# Patient Record
Sex: FEMALE | Race: WHITE | NOT HISPANIC OR LATINO | Employment: UNEMPLOYED | URBAN - METROPOLITAN AREA
[De-identification: names, ages, dates, MRNs, and addresses within clinical notes are randomized per-mention and may not be internally consistent; named-entity substitution may affect disease eponyms.]

---

## 2017-03-07 ENCOUNTER — ALLSCRIPTS OFFICE VISIT (OUTPATIENT)
Dept: OTHER | Facility: OTHER | Age: 47
End: 2017-03-07

## 2017-05-15 ENCOUNTER — ALLSCRIPTS OFFICE VISIT (OUTPATIENT)
Dept: OTHER | Facility: OTHER | Age: 47
End: 2017-05-15

## 2017-06-01 ENCOUNTER — GENERIC CONVERSION - ENCOUNTER (OUTPATIENT)
Dept: OTHER | Facility: OTHER | Age: 47
End: 2017-06-01

## 2017-10-20 ENCOUNTER — ALLSCRIPTS OFFICE VISIT (OUTPATIENT)
Dept: OTHER | Facility: OTHER | Age: 47
End: 2017-10-20

## 2017-10-21 NOTE — PROGRESS NOTES
Assessment  1  Cellulitis (682 9) (L03 90)    Plan  Cellulitis    · Cephalexin 500 MG Oral Capsule; TAKE 1 CAPSULE EVERY 12 HOURS DAILY   · Follow Up if Not Better Evaluation and Treatment  Follow-up  Status: Complete  Done:  [id number] 01:59PM    Chief Complaint  PT C/O BEE STING ON LT ANKLE X 2 WEEKS  PT STATES IT HAS BECOME REAL ITCHY AND SWELLS AT NIGHT  History of Present Illness  HPI: Patient is here status post bite left lateral ankle roughly 1/2 weeks ago  Patient with itching over the past 3 days  No drainage or redness noted  No fever noted  No pain noted  Patient using Neosporin  Review of Systems    Constitutional: No fever, no chills, feels well, no tiredness, no recent weight gain or loss  ENT: no ear ache, no loss of hearing, no nosebleeds or nasal discharge, no sore throat or hoarseness  Cardiovascular: no complaints of slow or fast heart rate, no chest pain, no palpitations, no leg claudication or lower extremity edema  Respiratory: no complaints of shortness of breath, no wheezing, no dyspnea on exertion, no orthopnea or PND  Breasts: no complaints of breast pain, breast lump or nipple discharge  Gastrointestinal: no complaints of abdominal pain, no constipation, no nausea or diarrhea, no vomiting, no bloody stools  Genitourinary: no complaints of dysuria, no incontinence, no pelvic pain, no dysmenorrhea, no vaginal discharge or abnormal vaginal bleeding  Musculoskeletal: no complaints of arthralgia, no myalgia, no joint swelling or stiffness, no limb pain or swelling  Integumentary: as noted in HPI  Neurological: no complaints of headache, no confusion, no numbness or tingling, no dizziness or fainting  Active Problems  1  Acute sinusitis (461 9) (J01 90)   2  Bilateral impacted cerumen (380 4) (H61 23)   3  Menstrual migraine without status migrainosus, not intractable (346 40) (G40 619)   4  Need for immunization against malaria (V05 8) (Z23)   5   Onychomycosis (110 1) (B35 1)   6  Other screening mammogram (V76 12) (Z12 31)   7  Tinea cruris (110 3) (B35 6)   8  Travel advice encounter (V65 49) (Z71 89)   9  Vertigo (780 4) (R42)   10  Viral Labyrinthitis (386 35)    Past Medical History  Active Problems And Past Medical History Reviewed: The active problems and past medical history were reviewed and updated today  Social History   · Denied: History of Alcohol Use (History)   · Never A Smoker    Surgical History  1  History of  Section   2  History of Complete Colonoscopy    Current Meds    The medication list was reviewed and updated today  Allergies  1  Amoxicillin CAPS   2  Penicillins   3  Sulfa Drugs    Vitals   Recorded: [id number] 01:47PM   Temperature 28 4 F   Systolic 592   Diastolic 60   Height 5 ft 4 in   Weight 136 lb    BMI Calculated 23 34   BSA Calculated 1 66     Physical Exam    Constitutional   General appearance: No acute distress, well appearing and well nourished  Skin   Skin and subcutaneous tissue: Abnormal  -- Mild redness around the bite left ankle          Signatures   Electronically signed by : Zohra Aguirre DO; Oct 20 2017  2:00PM EST                       (Author)

## 2018-01-13 VITALS
SYSTOLIC BLOOD PRESSURE: 118 MMHG | HEIGHT: 64 IN | WEIGHT: 133 LBS | DIASTOLIC BLOOD PRESSURE: 64 MMHG | BODY MASS INDEX: 22.71 KG/M2 | TEMPERATURE: 97.7 F

## 2018-01-14 VITALS
SYSTOLIC BLOOD PRESSURE: 124 MMHG | TEMPERATURE: 97 F | BODY MASS INDEX: 22.2 KG/M2 | WEIGHT: 130 LBS | DIASTOLIC BLOOD PRESSURE: 70 MMHG | HEIGHT: 64 IN

## 2018-01-14 VITALS
DIASTOLIC BLOOD PRESSURE: 60 MMHG | BODY MASS INDEX: 23.22 KG/M2 | TEMPERATURE: 98.1 F | WEIGHT: 136 LBS | HEIGHT: 64 IN | SYSTOLIC BLOOD PRESSURE: 100 MMHG

## 2018-06-22 ENCOUNTER — OFFICE VISIT (OUTPATIENT)
Dept: FAMILY MEDICINE CLINIC | Facility: CLINIC | Age: 48
End: 2018-06-22
Payer: COMMERCIAL

## 2018-06-22 VITALS
SYSTOLIC BLOOD PRESSURE: 112 MMHG | HEIGHT: 64 IN | WEIGHT: 131 LBS | TEMPERATURE: 97.7 F | DIASTOLIC BLOOD PRESSURE: 68 MMHG | BODY MASS INDEX: 22.36 KG/M2

## 2018-06-22 DIAGNOSIS — S39.012A STRAIN OF LUMBAR REGION, INITIAL ENCOUNTER: ICD-10-CM
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
## 2018-06-22 DIAGNOSIS — S16.1XXA STRAIN OF NECK MUSCLE, INITIAL ENCOUNTER: Primary | ICD-10-CM

## 2018-06-22 DIAGNOSIS — S29.019A THORACIC MYOFASCIAL STRAIN, INITIAL ENCOUNTER: ICD-10-CM

## 2018-06-22 PROCEDURE — 99214 OFFICE O/P EST MOD 30 MIN: CPT | Performed by: FAMILY MEDICINE
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
## 2018-07-05 ENCOUNTER — OFFICE VISIT (OUTPATIENT)
Dept: FAMILY MEDICINE CLINIC | Facility: CLINIC | Age: 48
End: 2018-07-05
Payer: COMMERCIAL

## 2018-07-05 VITALS
DIASTOLIC BLOOD PRESSURE: 74 MMHG | SYSTOLIC BLOOD PRESSURE: 112 MMHG | WEIGHT: 135.6 LBS | BODY MASS INDEX: 23.15 KG/M2 | HEIGHT: 64 IN

## 2018-07-05 DIAGNOSIS — Z12.4 SCREENING FOR CERVICAL CANCER: Primary | ICD-10-CM

## 2018-07-05 DIAGNOSIS — M54.12 CERVICAL RADICULOPATHY: ICD-10-CM

## 2018-07-05 DIAGNOSIS — S29.019A THORACIC MYOFASCIAL STRAIN, INITIAL ENCOUNTER: ICD-10-CM

## 2018-07-05 DIAGNOSIS — S39.012A STRAIN OF LUMBAR REGION, INITIAL ENCOUNTER: ICD-10-CM

## 2018-07-05 PROCEDURE — 99214 OFFICE O/P EST MOD 30 MIN: CPT | Performed by: FAMILY MEDICINE
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
## 2018-07-31 ENCOUNTER — OFFICE VISIT (OUTPATIENT)
Dept: FAMILY MEDICINE CLINIC | Facility: CLINIC | Age: 48
End: 2018-07-31
Payer: COMMERCIAL

## 2018-07-31 VITALS
DIASTOLIC BLOOD PRESSURE: 62 MMHG | SYSTOLIC BLOOD PRESSURE: 100 MMHG | BODY MASS INDEX: 23.35 KG/M2 | WEIGHT: 136.8 LBS | HEIGHT: 64 IN

## 2018-07-31 DIAGNOSIS — M25.551 RIGHT HIP PAIN: Primary | ICD-10-CM

## 2018-07-31 PROCEDURE — 99213 OFFICE O/P EST LOW 20 MIN: CPT | Performed by: FAMILY MEDICINE
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
## 2018-08-01 ENCOUNTER — TELEPHONE (OUTPATIENT)
Dept: FAMILY MEDICINE CLINIC | Facility: CLINIC | Age: 48
End: 2018-08-01

## 2018-08-02 NOTE — TELEPHONE ENCOUNTER
----- Message from Erma Malagon DO sent at 8/1/2018  5:04 PM EDT -----  Call patient    Mammogram normal   Recheck 1 year

## 2018-08-03 ENCOUNTER — HOSPITAL ENCOUNTER (OUTPATIENT)
Dept: RADIOLOGY | Facility: HOSPITAL | Age: 48
Discharge: HOME | End: 2018-08-03
Payer: COMMERCIAL

## 2018-08-03 DIAGNOSIS — M25.551 RIGHT HIP PAIN: ICD-10-CM

## 2018-08-03 PROCEDURE — 73502 X-RAY EXAM HIP UNI 2-3 VIEWS: CPT

## 2018-08-08 ENCOUNTER — EVALUATION (OUTPATIENT)
Dept: PHYSICAL THERAPY | Facility: CLINIC | Age: 48
End: 2018-08-08
Payer: COMMERCIAL

## 2018-08-08 DIAGNOSIS — M25.551 RIGHT HIP PAIN: Primary | ICD-10-CM

## 2018-08-08 PROCEDURE — G8979 MOBILITY GOAL STATUS: HCPCS

## 2018-08-08 PROCEDURE — 97161 PT EVAL LOW COMPLEX 20 MIN: CPT

## 2018-08-08 PROCEDURE — G8978 MOBILITY CURRENT STATUS: HCPCS
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
## 2018-08-16 ENCOUNTER — OFFICE VISIT (OUTPATIENT)
Dept: PHYSICAL THERAPY | Facility: CLINIC | Age: 48
End: 2018-08-16
Payer: COMMERCIAL

## 2018-08-16 DIAGNOSIS — M25.551 RIGHT HIP PAIN: Primary | ICD-10-CM

## 2018-08-16 PROCEDURE — 97110 THERAPEUTIC EXERCISES: CPT

## 2018-08-16 NOTE — PROGRESS NOTES
Daily Note     Today's date: 2018  Patient name: Cornell Mckinney  : 1970  MRN: [id number]  Referring provider: Lisa Mckenna DO  Dx:   Encounter Diagnosis     ICD-10-CM    1  Right hip pain M25 551                   Subjective: Patient presents for first treatment since IE one week ago  She states since that time, she has been consistent with HEP and has no issues or symptoms when completing, however she has not noticed any improvement in R hip discomfort  Objective: See treatment diary below  Progressed strengthening program this visit  Assessment: Tolerated treatment well  Good performance of all activities today with no increase in discomfort noted  Patient did report muscle "burning" during hip program, likely due to decreased strength/endurance of muscles  Patient exhibited good technique with therapeutic exercises and would benefit from continued PT      Plan: Continue per plan of care  Progress treatment as tolerated        Precautions: none    Daily Treatment Diary         Manuals                       HS, piriformis stretch AN                     Manual Geoff stretch AN                                                                     Exercise Diary                        Bike 10'                                            HS stretch 30"x3                     Glute bridge 3x10                     Clam shells 3" 2x15                     SLR flexion 1# 2x10                     SLR abduction 1# 2x10                                                                     Iso hip hike NV                     Mini squats 3x10                     Black band stepping S/S 5 ea                     Step ups Lateral 6"x10                     Rocker board balance AP/ML 2' ea                     HR/TR 30 ea                                                                                                                                             Modalities

## 2018-08-22 ENCOUNTER — OFFICE VISIT (OUTPATIENT)
Dept: PHYSICAL THERAPY | Facility: CLINIC | Age: 48
End: 2018-08-22
Payer: COMMERCIAL

## 2018-08-22 DIAGNOSIS — M25.551 RIGHT HIP PAIN: Primary | ICD-10-CM

## 2018-08-22 PROCEDURE — 97140 MANUAL THERAPY 1/> REGIONS: CPT

## 2018-08-22 PROCEDURE — 97110 THERAPEUTIC EXERCISES: CPT

## 2018-08-22 NOTE — PROGRESS NOTES
Daily Note     Today's date: 2018  Patient name: Iva Escalante  : 1970  MRN: [id number]  Referring provider: Taylor Singh DO  Dx:   Encounter Diagnosis     ICD-10-CM    1  Right hip pain M25 551                   Subjective: Patient reported not noticing symptoms in R hip as much compared to previously  Objective: See treatment diary below  Updated home program       Assessment: No difficulty completing program other than expected fatigue with strengthening due to weakness  Manual addressed presence of LE flexibility deficits  Plan: Continue per plan of care  Progress treatment as tolerated           Precautions: none    Daily Treatment Diary  Manuals                     HS, piriformis stretch AN  EV                   Manual Geoff stretch AN  EV                                                                   Exercise Diary                        Bike 10'  10 min                                          HS stretch 30"x3 30"x3                   Glute bridge 3x10 3x10                   Clam shells 3" 2x15  3" hold 2x15                   SLR flexion 1# 2x10  1# 2x15                   SLR abduction 1# 2x10  1# 2x15                                                                   Iso hip hike NV  NV                   Mini squats 3x10 3x10                   Black band stepping S/S 5 ea x5 ea                   Step ups - lateral Lateral 6"x10 6"  x15                   Rocker board balance AP/ML 2' ea AP/ML 2 min ea                   HR/TR 30 ea x30 ea                                                                                                                                           Modalities                                                                        HEP: bridges, SLR flex, SLR abd, clamshells

## 2018-08-29 ENCOUNTER — OFFICE VISIT (OUTPATIENT)
Dept: PHYSICAL THERAPY | Facility: CLINIC | Age: 48
End: 2018-08-29
Payer: COMMERCIAL

## 2018-08-29 DIAGNOSIS — M25.551 RIGHT HIP PAIN: Primary | ICD-10-CM

## 2018-08-29 PROCEDURE — 97110 THERAPEUTIC EXERCISES: CPT

## 2018-08-29 PROCEDURE — 97140 MANUAL THERAPY 1/> REGIONS: CPT

## 2018-08-29 NOTE — PROGRESS NOTES
Daily Note     Today's date: 2018  Patient name: Tae Odell  : 1970  MRN: [id number]  Referring provider: Isabella Lundy DO  Dx:   Encounter Diagnosis     ICD-10-CM    1  Right hip pain M25 551                   Subjective: Patient has had no increase in R hip symptoms since previous treatment, having no difficulty tolerating car ride to [place] over the the weekend  Objective: See treatment diary below      Assessment: Progressed hip strengthening with minimal to no difficulty, rather some fatigue due to LE weakness  Manual remains appropriate to address LE flexibility deficits  Plan: Continue per plan of care  Progress treatment as tolerated           Precautions: none    Daily Treatment Diary  Manuals                   HS, piriformis stretch AN  EV  EV                 Manual Geoff stretch AN  EV  EV                                                                 Exercise Diary                        Bike 10'  10 min  10 min                                        HS stretch 30"x3 30"x3  30"x3                 Glute bridge 3x10 3x10  3x10                 Clam shells 3" 2x15  3" hold 2x15  3" hold, 2x15                 SLR flexion 1# 2x10  1# 2x15  2# 2x10                 SLR abduction 1# 2x10  1# 2x15  2# 2x10                                                                 Iso hip hike NV  NV  3"x  20                 Mini squats 3x10 3x10  3x10                 Black band stepping S/S 5 ea x5 ea  x5 ea                 Step ups - lateral Lateral 6"x10 6"  x15  6"   x20                 Rocker board balance AP/ML 2' ea AP/ML 2 min ea  AP/ML 2 min ea                 HR/TR 30 ea x30 ea  x30 ea                                                                                                                                         Modalities                                                                        HEP: bridges, SLR flex, SLR abd, clamshells, hip hike

## 2018-08-31 ENCOUNTER — OFFICE VISIT (OUTPATIENT)
Dept: FAMILY MEDICINE CLINIC | Facility: CLINIC | Age: 48
End: 2018-08-31
Payer: COMMERCIAL

## 2018-08-31 VITALS
HEIGHT: 64 IN | SYSTOLIC BLOOD PRESSURE: 114 MMHG | BODY MASS INDEX: 23.52 KG/M2 | OXYGEN SATURATION: 99 % | RESPIRATION RATE: 12 BRPM | HEART RATE: 77 BPM | DIASTOLIC BLOOD PRESSURE: 68 MMHG | WEIGHT: 137.8 LBS

## 2018-08-31 DIAGNOSIS — M25.551 RIGHT HIP PAIN: Primary | ICD-10-CM
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
## 2018-08-31 PROCEDURE — 99213 OFFICE O/P EST LOW 20 MIN: CPT | Performed by: FAMILY MEDICINE
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
## 2018-09-05 ENCOUNTER — OFFICE VISIT (OUTPATIENT)
Dept: PHYSICAL THERAPY | Facility: CLINIC | Age: 48
End: 2018-09-05
Payer: COMMERCIAL

## 2018-09-05 DIAGNOSIS — M25.551 RIGHT HIP PAIN: Primary | ICD-10-CM

## 2018-09-05 PROCEDURE — G8979 MOBILITY GOAL STATUS: HCPCS

## 2018-09-05 PROCEDURE — 97140 MANUAL THERAPY 1/> REGIONS: CPT

## 2018-09-05 PROCEDURE — G8980 MOBILITY D/C STATUS: HCPCS

## 2018-09-05 PROCEDURE — 97110 THERAPEUTIC EXERCISES: CPT

## 2018-09-05 NOTE — PROGRESS NOTES
PT Discharge    Today's date: 2018  Patient name: Neena Griffith  : 1970  MRN: [id number]  Referring provider: Michael   Dx:   Encounter Diagnosis     ICD-10-CM    1  Right hip pain M25 [id number]                   Assessment    Assessment details: Neena Griffith has been compliant with attending PT and HEP since initial eval  Rudy Vazquez has made improvements in objective data since IE and has maximized function  Patient is agreeable to d/c to HEP at this time, and will contact clinic with any exercise related questions or concerns as needed  Understanding of Dx/Px/POC: good   Prognosis: good    Goals  STG  1) R hip strength will improve 1/2 grade in 3 weeks  -Met  2) Patient will tolerate 3 mile walk through neighborhood with less than 3/10 pain in 3 weeks  -Met  LTG  1) Patient is independent in HEP  -Met  2) R hip strength will be at least 4+/5 in 6 weeks  -Met  3) Patient will ascend/descend one flight of stairs independently with no increased pain in 6 weeks  -Met  4) Ambulation will be improved to PLOF in 6 weeks  -Met  5) Recreational performance will be improved to PLOF in 6 weeks  -Met    Plan  Planned therapy interventions: home exercise program  Treatment plan discussed with: patient        Subjective Evaluation    History of Present Illness  Date of onset: 2018  Mechanism of injury: Patient states her hip is feeling much better than when she began therapy  She reports she has been compliant with completing HEP and feels comfortable continuing on her own  Quality of life: good    Pain  Current pain ratin  At worst pain rating: 3  Quality: dull ache  Aggravating factors: running  Progression: improved      Diagnostic Tests  X-ray: normal  Patient Goals  Patient goals for therapy: decreased pain and return to sport/leisure activities  Patient goal: get back to yoga        Objective     Palpation     Right   No palpable tenderness to the gluteus medius, piriformis and TFL  Tenderness     Right Hip   No tenderness in the greater trochanter  Lumbar Screen  Lumbar range of motion within normal limits  Neurological Testing     Additional Neurological Details  Normal LE neuro screen    Active Range of Motion     Right Hip   Normal active range of motion    Strength/Myotome Testing     Right Hip   Planes of Motion   Flexion: 5  Extension: 5  Abduction: 5  Adduction: 5    Isolated Muscles   Gluteus maximums: 5  Gluteus medius: 5    Tests     Right Hip   Positive modified Ammy  Negative Ammy       Precautions: none    Daily Treatment Diary  Manuals  8/16 8/22 8/29  9/5               HS, piriformis stretch AN  EV  EV  AN               Manual Geoff stretch AN  EV  EV  AN                                                               Exercise Diary                        Bike 10'  10 min 10 min 10'                                      HS stretch 30"x3 30"x3 30"x3 30"x3               Glute bridge 3x10 3x10 3x10 3x10               Clam shells 3" 2x15  3" hold 2x15 3" hold, 2x15 PTB 3" hold 2x10               SLR flexion 1# 2x10  1# 2x15 2# 2x10 2# 2x10               SLR abduction 1# 2x10  1# 2x15 2# 2x10 2# 2x10                                                               Iso hip hike NV  NV 3"x  20 5"x20               Mini squats 3x10 3x10 3x10 5# 3x10               Black band stepping S/S 5 ea x5 ea x5 ea S/S 10 ea               Step ups - lateral Lateral 6"x10 6"  x15 6"   x20 6"  x30               Rocker board balance AP/ML 2' ea AP/ML 2 min ea AP/ML 2 min ea AP/ML 2 min ea               HR/TR 30 ea x30 ea x30 ea 30 ea                                                                                                                                       Modalities

## 2018-09-21 ENCOUNTER — OFFICE VISIT (OUTPATIENT)
Dept: FAMILY MEDICINE CLINIC | Facility: CLINIC | Age: 48
End: 2018-09-21
Payer: COMMERCIAL

## 2018-09-21 VITALS
HEIGHT: 64 IN | DIASTOLIC BLOOD PRESSURE: 68 MMHG | BODY MASS INDEX: 22.94 KG/M2 | SYSTOLIC BLOOD PRESSURE: 118 MMHG | WEIGHT: 134.4 LBS

## 2018-09-21 DIAGNOSIS — T63.444A BEE STING, UNDETERMINED INTENT, INITIAL ENCOUNTER: Primary | ICD-10-CM

## 2018-09-21 PROBLEM — T63.441A BEE STING: Status: ACTIVE | Noted: 2018-09-21

## 2018-09-21 PROCEDURE — 99213 OFFICE O/P EST LOW 20 MIN: CPT | Performed by: FAMILY MEDICINE

## 2018-09-21 PROCEDURE — 3008F BODY MASS INDEX DOCD: CPT | Performed by: FAMILY MEDICINE

## 2018-09-21 RX ORDER — PREDNISONE 20 MG/1
40 TABLET ORAL DAILY
Qty: 10 TABLET | Refills: 0 | Status: SHIPPED | OUTPATIENT
Start: 2018-09-21 | End: 2020-03-10

## 2018-09-21 RX ORDER — AZITHROMYCIN 250 MG/1
TABLET, FILM COATED ORAL
Qty: 6 TABLET | Refills: 0 | Status: SHIPPED | OUTPATIENT
Start: 2018-09-21 | End: 2018-09-26

## 2018-09-21 RX ORDER — LORATADINE 10 MG/1
10 TABLET ORAL DAILY
Qty: 30 TABLET | Refills: 0 | Status: SHIPPED | OUTPATIENT
Start: 2018-09-21 | End: 2020-03-10

## 2018-09-21 NOTE — PROGRESS NOTES
Assessment/Plan:    53-year-old woman with:  Bee stings  Discussed treatment options with risks and benefits  Encouraged to daily Claritin with Sarna lotion as needed topically and will give steroid burst and advised that if symptoms are not resolving she can begin a Z-Uriel  No problem-specific Assessment & Plan notes found for this encounter  Diagnoses and all orders for this visit:    Bee sting, undetermined intent, initial encounter  -     predniSONE 20 mg tablet; Take 2 tablets (40 mg total) by mouth daily  -     azithromycin (ZITHROMAX) 250 mg tablet; Take 2 tabs by mouth on day 1 then 1 tab daily for 4 more days  -     camphor-menthol (SARNA) lotion; Apply topically as needed for itching  -     loratadine (CLARITIN) 10 mg tablet; Take 1 tablet (10 mg total) by mouth daily          Subjective:   Chief Complaint   Patient presents with    Follow-up     Patient reports she was stung by at least 3 Bee's 2 days ago and slight swelling at sting sites and left arm is sore   Medication Refill     [facility] in [facility] for any med today  Patient ID: Eva Dupont is a 57 y o  female  Patient is a 53-year-old woman who presents complaining of pain redness and swelling with irritation of bilateral arms at the site of multiple bee stings that she got 2 days ago  No fevers chills nausea vomiting  Tolerating p o  intake  Medication Refill         The following portions of the patient's history were reviewed and updated as appropriate: allergies, current medications, past family history, past medical history, past social history, past surgical history and problem list     Review of Systems   Constitutional: Negative  HENT: Negative  Eyes: Negative  Respiratory: Negative  Cardiovascular: Negative  Gastrointestinal: Negative  Endocrine: Negative  Genitourinary: Negative  Musculoskeletal: Negative  Skin: Positive for wound  Allergic/Immunologic: Negative  Neurological: Negative  Hematological: Negative  Psychiatric/Behavioral: Negative  All other systems reviewed and are negative  Objective:      /68 (BP Location: Right arm, Patient Position: Sitting, Cuff Size: Standard)   Ht 5' 3 5" (1 613 m)   Wt 61 kg (134 lb 6 4 oz)   BMI 23 43 kg/m²          Physical Exam   Constitutional: She is oriented to person, place, and time  She appears well-developed and well-nourished  HENT:   Head: Atraumatic  Right Ear: External ear normal    Left Ear: External ear normal    Eyes: Conjunctivae and EOM are normal  Pupils are equal, round, and reactive to light  Neck: Normal range of motion  Pulmonary/Chest: Effort normal  No respiratory distress  Musculoskeletal: Normal range of motion  Neurological: She is alert and oriented to person, place, and time  No cranial nerve deficit  Skin: Skin is warm and dry  Multiple wheels on bilateral arms   Psychiatric: She has a normal mood and affect   Her behavior is normal  Judgment and thought content normal

## 2020-03-10 ENCOUNTER — OFFICE VISIT (OUTPATIENT)
Dept: FAMILY MEDICINE CLINIC | Facility: CLINIC | Age: 50
End: 2020-03-10
Payer: COMMERCIAL

## 2020-03-10 VITALS
WEIGHT: 137.4 LBS | HEIGHT: 64 IN | OXYGEN SATURATION: 98 % | BODY MASS INDEX: 23.46 KG/M2 | HEART RATE: 72 BPM | SYSTOLIC BLOOD PRESSURE: 106 MMHG | DIASTOLIC BLOOD PRESSURE: 62 MMHG | TEMPERATURE: 96.3 F

## 2020-03-10 DIAGNOSIS — L30.9 DERMATITIS: ICD-10-CM

## 2020-03-10 DIAGNOSIS — Z00.00 WELL ADULT EXAM: Primary | ICD-10-CM

## 2020-03-10 PROCEDURE — 3008F BODY MASS INDEX DOCD: CPT | Performed by: FAMILY MEDICINE

## 2020-03-10 PROCEDURE — 99396 PREV VISIT EST AGE 40-64: CPT | Performed by: FAMILY MEDICINE

## 2020-03-10 NOTE — PROGRESS NOTES
Assessment/Plan:  Patient go for laboratory studies fasting  Guidance given overall  Patient up-to-date with gynecologic care as well as mammograms  Patient will have colonoscopy at age 50  Patient up-to-date with vaccines  Diagnoses and all orders for this visit:    Well adult exam  -     CBC and differential; Future  -     Comprehensive metabolic panel; Future  -     Lipid panel; Future  -     TSH, 3rd generation with Free T4 reflex; Future    Dermatitis  -     nystatin-triamcinolone (MYCOLOG-II) cream; Apply topically 2 (two) times a day            Subjective:        Patient ID: Natanael Humphries is a 54 y o  female  Patient is here for wellness exam   No chest pain or shortness of breath problems urinating or defecating  No headache or blurred vision  No significant the skin lesions changing other than patient having a rash behind her left ear over the past 2 months  Patient has use some hydrocortisone with some improvement  No new other skin products  Patient up-to-date with gynecologic care as well as mammography  No early family history of colon cancer  Patient up-to-date with vaccines  The following portions of the patient's history were reviewed and updated as appropriate: allergies, current medications, past family history, past medical history, past social history, past surgical history and problem list       Review of Systems   Constitutional: Negative  HENT: Negative  Eyes: Negative  Respiratory: Negative  Cardiovascular: Negative  Gastrointestinal: Negative  Endocrine: Negative  Genitourinary: Negative  Musculoskeletal: Negative  Skin: Negative  Allergic/Immunologic: Negative  Neurological: Negative  Hematological: Negative  Psychiatric/Behavioral: Negative              Objective:               /62 (BP Location: Left arm, Patient Position: Sitting, Cuff Size: Standard)   Pulse 72   Temp (!) 96 3 °F (35 7 °C) (Tympanic)   Ht 5' 4" (1 626 m)   Wt 62 3 kg (137 lb 6 4 oz)   SpO2 98%   BMI 23 58 kg/m²          Physical Exam   Constitutional: She appears well-developed and well-nourished  No distress  HENT:   Head: Normocephalic  Right Ear: External ear normal    Left Ear: External ear normal    Mouth/Throat: Oropharynx is clear and moist  No oropharyngeal exudate  Eyes: Pupils are equal, round, and reactive to light  EOM are normal  Right eye exhibits no discharge  Left eye exhibits no discharge  No scleral icterus  Neck: Normal range of motion  Neck supple  No thyromegaly present  Cardiovascular: Normal rate, regular rhythm, normal heart sounds and intact distal pulses  Exam reveals no gallop and no friction rub  No murmur heard  Pulmonary/Chest: Effort normal and breath sounds normal  No respiratory distress  She has no wheezes  She has no rales  She exhibits no tenderness  Abdominal: Soft  Bowel sounds are normal  She exhibits no distension  There is no tenderness  There is no rebound and no guarding  Musculoskeletal: Normal range of motion  She exhibits no edema or tenderness  Lymphadenopathy:     She has no cervical adenopathy  Neurological: She is alert  No cranial nerve deficit  She exhibits normal muscle tone  Coordination normal    Skin: Skin is warm and dry  Rash noted  She is not diaphoretic  No erythema  No pallor  Mild erythematous rash behind left pinna   Psychiatric: She has a normal mood and affect  Her behavior is normal  Judgment and thought content normal    Nursing note and vitals reviewed

## 2020-07-31 ENCOUNTER — TELEMEDICINE (OUTPATIENT)
Dept: FAMILY MEDICINE CLINIC | Facility: CLINIC | Age: 50
End: 2020-07-31
Payer: COMMERCIAL

## 2020-07-31 VITALS — TEMPERATURE: 96.5 F

## 2020-07-31 DIAGNOSIS — Z20.828 EXPOSURE TO SARS-ASSOCIATED CORONAVIRUS: ICD-10-CM

## 2020-07-31 DIAGNOSIS — Z20.828 EXPOSURE TO SARS-ASSOCIATED CORONAVIRUS: Primary | ICD-10-CM

## 2020-07-31 PROCEDURE — U0003 INFECTIOUS AGENT DETECTION BY NUCLEIC ACID (DNA OR RNA); SEVERE ACUTE RESPIRATORY SYNDROME CORONAVIRUS 2 (SARS-COV-2) (CORONAVIRUS DISEASE [COVID-19]), AMPLIFIED PROBE TECHNIQUE, MAKING USE OF HIGH THROUGHPUT TECHNOLOGIES AS DESCRIBED BY CMS-2020-01-R: HCPCS | Performed by: FAMILY MEDICINE

## 2020-07-31 PROCEDURE — 99214 OFFICE O/P EST MOD 30 MIN: CPT | Performed by: FAMILY MEDICINE

## 2020-07-31 NOTE — PROGRESS NOTES
COVID-19 Virtual Visit     Assessment/Plan:    Problem List Items Addressed This Visit     None      Visit Diagnoses     Exposure to SARS-associated coronavirus    -  Primary    Relevant Orders    Novel Coronavirus ([id number]), PCR [facility] - Collected at [facility] or [facility]        This virtual check-in was done via [employer]  Disposition:      I referred Thao to one of our centralized sites for a COVID-19 swab    I spent 15 minutes directly with the patient during this visit    Encounter provider Giselle Downey DO    Provider located at [address] [place] [id number]    Recent Visits  No visits were found meeting these conditions  Showing recent visits within past 7 days and meeting all other requirements     Today's Visits  Date Type Provider Dept   07/31/20 Telemedicine Quan Bearden DO [address] [facility] today's visits and meeting all other requirements     Future Appointments  No visits were found meeting these conditions  Showing future appointments within next 150 days and meeting all other requirements        Patient agrees to participate in a virtual check in via telephone or video visit instead of presenting to the office to address urgent/immediate medical needs  Patient is aware this is a billable service  After connecting through [employer], the patient was identified by name and date of birth  Nettie Pickett was informed that this was a telemedicine visit and that the exam was being conducted confidentially over secure lines  My office door was closed  No one else was in the room  Nettie Pickett acknowledged consent and understanding of privacy and security of the telemedicine visit  I informed the patient that I have reviewed her record in Epic and presented the opportunity for her to ask any questions regarding the visit today  The patient agreed to participate  Ton Gay is a 52 y o  female who is concerned about COVID-19  She reports headache, sore throat and Postnasal drip and nasal congestion  She has not experienced fever, chills, cough, shortness of breath, fatigue, myalgias, anosmia, abdominal pain, diarrhea, nausea and vomiting She has not had contact with a person who is under investigation for or who is positive for COVID-19 within the last 14 days  She has not been hospitalized recently for fever and/or lower respiratory symptoms  History reviewed  No pertinent past medical history  Past Surgical History:   Procedure Laterality Date     SECTION      COLONOSCOPY         Current Outpatient Medications   Medication Sig Dispense Refill    nystatin-triamcinolone (MYCOLOG-II) cream Apply topically 2 (two) times a day (Patient not taking: Reported on 2020) 30 g 1     No current facility-administered medications for this visit  Allergies   Allergen Reactions    Amoxicillin     Penicillins     Sulfa Antibiotics     Sulfasalazine        Review of Systems   Constitutional: Negative  HENT: Positive for congestion, postnasal drip and sore throat  Eyes: Negative  Respiratory: Negative  Cardiovascular: Negative  Gastrointestinal: Negative  Endocrine: Negative  Genitourinary: Negative  Musculoskeletal: Negative  Skin: Negative  Allergic/Immunologic: Negative  Neurological: Negative  Hematological: Negative  Psychiatric/Behavioral: Negative  Video Exam    Vitals:    20 0944   Temp: (!) 96 5 °F (35 8 °C)         Thao appears no distress  Physical Exam   Constitutional: She appears well-developed and well-nourished  No distress  HENT:   Head: Normocephalic and atraumatic  Right Ear: External ear normal    Left Ear: External ear normal    Eyes: Pupils are equal, round, and reactive to light  EOM are normal    Neck: Normal range of motion  Neck supple  Cardiovascular: Normal rate and regular rhythm  Pulmonary/Chest: Effort normal  No respiratory distress  Neurological: She is alert  Skin: She is not diaphoretic  Psychiatric: She has a normal mood and affect  Her behavior is normal  Thought content normal         VIRTUAL VISIT DISCLAIMER    Ernestina Reis acknowledges that she has consented to an online visit or consultation  She understands that the online visit is based solely on information provided by her, and that, in the absence of a face-to-face physical evaluation by the physician, the diagnosis she receives is both limited and provisional in terms of accuracy and completeness  This is not intended to replace a full medical face-to-face evaluation by the physician  Ernestina Reis understands and accepts these terms

## 2020-08-02 LAB — SARS-COV-2 RNA SPEC QL NAA+PROBE: NOT DETECTED

## 2020-09-09 ENCOUNTER — OFFICE VISIT (OUTPATIENT)
Dept: FAMILY MEDICINE CLINIC | Facility: CLINIC | Age: 50
End: 2020-09-09
Payer: COMMERCIAL

## 2020-09-09 VITALS
WEIGHT: 141.4 LBS | BODY MASS INDEX: 24.14 KG/M2 | HEIGHT: 64 IN | SYSTOLIC BLOOD PRESSURE: 106 MMHG | DIASTOLIC BLOOD PRESSURE: 64 MMHG | TEMPERATURE: 98.1 F

## 2020-09-09 DIAGNOSIS — Z23 ENCOUNTER FOR IMMUNIZATION: ICD-10-CM

## 2020-09-09 DIAGNOSIS — B36.0 TINEA VERSICOLOR: Primary | ICD-10-CM

## 2020-09-09 PROCEDURE — 90682 RIV4 VACC RECOMBINANT DNA IM: CPT

## 2020-09-09 PROCEDURE — 90471 IMMUNIZATION ADMIN: CPT

## 2020-09-09 PROCEDURE — 99213 OFFICE O/P EST LOW 20 MIN: CPT | Performed by: FAMILY MEDICINE

## 2020-09-09 PROCEDURE — 1036F TOBACCO NON-USER: CPT | Performed by: FAMILY MEDICINE

## 2020-09-09 RX ORDER — PRENATAL VIT 91/IRON/FOLIC/DHA 28-975-200
COMBINATION PACKAGE (EA) ORAL 2 TIMES DAILY
Qty: 30 G | Refills: 2 | Status: SHIPPED | OUTPATIENT
Start: 2020-09-09 | End: 2022-03-21

## 2020-09-09 NOTE — PROGRESS NOTES
Assessment/Plan: patient use to benefit cream twice daily as directed  Guidance given overall  Follow-up as needed       Diagnoses and all orders for this visit:    Tinea versicolor  -     terbinafine (LamISIL) 1 % cream; Apply topically 2 (two) times a day            Subjective:        Patient ID: Darryl Castro is a 52 y o  female  Patient here with change in color and size of skin lesion on right thoracic region  No bleeding or itching noted  No history of skin cancer  Unknown duration of time  No treatment use  The following portions of the patient's history were reviewed and updated as appropriate: allergies, current medications, past family history, past medical history, past social history, past surgical history and problem list       Review of Systems   Constitutional: Negative  HENT: Negative  Eyes: Negative  Respiratory: Negative  Cardiovascular: Negative  Gastrointestinal: Negative  Endocrine: Negative  Genitourinary: Negative  Musculoskeletal: Negative  Skin: Positive for color change  Allergic/Immunologic: Negative  Neurological: Negative  Hematological: Negative  Psychiatric/Behavioral: Negative  Objective:               /64 (BP Location: Right arm, Patient Position: Sitting, Cuff Size: Standard)   Temp 98 1 °F (36 7 °C) (Tympanic)   Ht 5' 4" (1 626 m)   Wt 64 1 kg (141 lb 6 4 oz)   LMP 08/19/2020 (Approximate)   BMI 24 27 kg/m²          Physical Exam  Vitals signs and nursing note reviewed  Constitutional:       Appearance: Normal appearance  Skin:     Capillary Refill: Capillary refill takes 2 to 3 seconds  Comments: Hypopigmented area circular on right thoracic region  Patient also with some midline and on the left side also  Neurological:      General: No focal deficit present  Mental Status: She is alert     Psychiatric:         Mood and Affect: Mood normal          Behavior: Behavior normal

## 2020-11-22 ENCOUNTER — OFFICE VISIT (OUTPATIENT)
Dept: URGENT CARE | Facility: CLINIC | Age: 50
End: 2020-11-22
Payer: COMMERCIAL

## 2020-11-22 VITALS
BODY MASS INDEX: 23.56 KG/M2 | TEMPERATURE: 98.2 F | WEIGHT: 138 LBS | HEART RATE: 80 BPM | HEIGHT: 64 IN | OXYGEN SATURATION: 96 % | RESPIRATION RATE: 18 BRPM

## 2020-11-22 DIAGNOSIS — R52 GENERALIZED BODY ACHES: ICD-10-CM

## 2020-11-22 DIAGNOSIS — Z11.59 SCREENING FOR VIRAL DISEASE: Primary | ICD-10-CM

## 2020-11-22 DIAGNOSIS — J02.9 SORE THROAT: ICD-10-CM

## 2020-11-22 PROCEDURE — U0003 INFECTIOUS AGENT DETECTION BY NUCLEIC ACID (DNA OR RNA); SEVERE ACUTE RESPIRATORY SYNDROME CORONAVIRUS 2 (SARS-COV-2) (CORONAVIRUS DISEASE [COVID-19]), AMPLIFIED PROBE TECHNIQUE, MAKING USE OF HIGH THROUGHPUT TECHNOLOGIES AS DESCRIBED BY CMS-2020-01-R: HCPCS | Performed by: PREVENTIVE MEDICINE

## 2020-11-22 PROCEDURE — 99203 OFFICE O/P NEW LOW 30 MIN: CPT | Performed by: PREVENTIVE MEDICINE

## 2020-11-24 LAB — SARS-COV-2 RNA SPEC QL NAA+PROBE: NOT DETECTED

## 2021-03-10 DIAGNOSIS — Z23 ENCOUNTER FOR IMMUNIZATION: ICD-10-CM

## 2021-03-15 ENCOUNTER — OFFICE VISIT (OUTPATIENT)
Dept: FAMILY MEDICINE CLINIC | Facility: CLINIC | Age: 51
End: 2021-03-15
Payer: COMMERCIAL

## 2021-03-15 VITALS
TEMPERATURE: 97.7 F | BODY MASS INDEX: 24.72 KG/M2 | WEIGHT: 144 LBS | SYSTOLIC BLOOD PRESSURE: 118 MMHG | DIASTOLIC BLOOD PRESSURE: 76 MMHG

## 2021-03-15 DIAGNOSIS — M25.551 RIGHT HIP PAIN: ICD-10-CM

## 2021-03-15 DIAGNOSIS — Z00.00 WELL ADULT EXAM: Primary | ICD-10-CM

## 2021-03-15 PROCEDURE — 3725F SCREEN DEPRESSION PERFORMED: CPT | Performed by: FAMILY MEDICINE

## 2021-03-15 PROCEDURE — 99396 PREV VISIT EST AGE 40-64: CPT | Performed by: FAMILY MEDICINE

## 2021-03-15 RX ORDER — KETOCONAZOLE 20 MG/ML
SHAMPOO TOPICAL ONCE
COMMUNITY
End: 2022-03-21

## 2021-03-15 RX ORDER — FLUCONAZOLE 150 MG/1
150 TABLET ORAL ONCE
COMMUNITY
End: 2022-03-21

## 2021-03-15 RX ORDER — MELOXICAM 15 MG/1
15 TABLET ORAL DAILY
Qty: 30 TABLET | Refills: 1 | Status: SHIPPED | OUTPATIENT
Start: 2021-03-15 | End: 2022-03-21

## 2021-03-15 NOTE — PROGRESS NOTES
Assessment/Plan:  Patient up-to-date with gynecologic care as well as mammograms  Patient up-to-date with vaccines  Labs reviewed with the patient at the present time  Patient did have colonoscopy in the past  Patient use meloxicam as well as home exercise program   To consider formal physical therapy if not seeing improvement  Diagnoses and all orders for this visit:    Well adult exam    Right hip pain  -     meloxicam (MOBIC) 15 mg tablet; Take 1 tablet (15 mg total) by mouth daily            Subjective:        Patient ID: Ricardo Thompson is a 52 y o  female  Patient is here for wellness exam   Patient has been having more frequent headaches  Patient does have some menstrual headaches and also some headaches not around her menstrual cycle  No significant photophobia scotomas or period  No URI issues  No chest pain or shortness breath or difficulty with urination defecation  Patient does see dermatologist   Patient has had colonoscopy in the past   This was normal   Patient is up-to-date with gynecologic care  Patient also up-to-date with mammograms  Patient just at laboratory studies done  Vaccines reviewed  The following portions of the patient's history were reviewed and updated as appropriate: allergies, current medications, past family history, past medical history, past social history, past surgical history and problem list       Review of Systems   Constitutional: Negative  HENT: Negative  Eyes: Negative  Respiratory: Negative  Cardiovascular: Negative  Gastrointestinal: Negative  Endocrine: Negative  Genitourinary: Negative  Musculoskeletal: Negative  Skin: Negative  Allergic/Immunologic: Negative  Neurological: Negative  Hematological: Negative  Psychiatric/Behavioral: Negative  Objective:      BMI Counseling: Body mass index is 24 72 kg/m²  The BMI is above normal  Nutrition recommendations include decreasing portion sizes  Exercise recommendations include moderate physical activity 150 minutes/week  Depression Screening and Follow-up Plan: Patient's depression screening was positive with a PHQ-2 score of 0  Clincally patient does not have depression  No treatment is required  /76 (BP Location: Right arm, Patient Position: Sitting, Cuff Size: Adult)   Temp 97 7 °F (36 5 °C) (Tympanic)   Wt 65 3 kg (144 lb)   BMI 24 72 kg/m²          Physical Exam  Vitals signs and nursing note reviewed  Constitutional:       General: She is not in acute distress  Appearance: Normal appearance  She is not ill-appearing, toxic-appearing or diaphoretic  HENT:      Head: Normocephalic and atraumatic  Right Ear: Tympanic membrane, ear canal and external ear normal  There is no impacted cerumen  Left Ear: Tympanic membrane, ear canal and external ear normal  There is no impacted cerumen  Nose: Nose normal  No congestion or rhinorrhea  Mouth/Throat:      Mouth: Mucous membranes are moist       Pharynx: No oropharyngeal exudate or posterior oropharyngeal erythema  Eyes:      General: No scleral icterus  Right eye: No discharge  Left eye: No discharge  Extraocular Movements: Extraocular movements intact  Conjunctiva/sclera: Conjunctivae normal       Pupils: Pupils are equal, round, and reactive to light  Neck:      Musculoskeletal: Normal range of motion and neck supple  No neck rigidity or muscular tenderness  Vascular: No carotid bruit  Cardiovascular:      Rate and Rhythm: Normal rate and regular rhythm  Pulses: Normal pulses  Heart sounds: Normal heart sounds  No murmur  No friction rub  No gallop  Pulmonary:      Effort: Pulmonary effort is normal  No respiratory distress  Breath sounds: Normal breath sounds  No stridor  No wheezing, rhonchi or rales  Chest:      Chest wall: No tenderness  Abdominal:      General: Abdomen is flat   Bowel sounds are normal  There is no distension  Palpations: Abdomen is soft  Tenderness: There is no abdominal tenderness  There is no guarding or rebound  Musculoskeletal: Normal range of motion  General: No swelling, tenderness, deformity or signs of injury  Right lower leg: No edema  Left lower leg: No edema  Lymphadenopathy:      Cervical: No cervical adenopathy  Skin:     General: Skin is warm and dry  Capillary Refill: Capillary refill takes less than 2 seconds  Coloration: Skin is not jaundiced  Findings: No bruising, erythema, lesion or rash  Neurological:      General: No focal deficit present  Mental Status: She is alert and oriented to person, place, and time  Cranial Nerves: No cranial nerve deficit  Sensory: No sensory deficit  Motor: No weakness  Coordination: Coordination normal       Gait: Gait normal    Psychiatric:         Mood and Affect: Mood normal          Behavior: Behavior normal          Thought Content:  Thought content normal          Judgment: Judgment normal

## 2021-03-29 ENCOUNTER — IMMUNIZATIONS (OUTPATIENT)
Dept: FAMILY MEDICINE CLINIC | Facility: HOSPITAL | Age: 51
End: 2021-03-29

## 2021-03-29 DIAGNOSIS — Z23 ENCOUNTER FOR IMMUNIZATION: Primary | ICD-10-CM

## 2021-03-29 PROCEDURE — 91301 SARS-COV-2 / COVID-19 MRNA VACCINE (MODERNA) 100 MCG: CPT

## 2021-03-29 PROCEDURE — 0011A SARS-COV-2 / COVID-19 MRNA VACCINE (MODERNA) 100 MCG: CPT

## 2021-04-29 ENCOUNTER — IMMUNIZATIONS (OUTPATIENT)
Dept: FAMILY MEDICINE CLINIC | Facility: HOSPITAL | Age: 51
End: 2021-04-29

## 2021-04-29 DIAGNOSIS — Z23 ENCOUNTER FOR IMMUNIZATION: Primary | ICD-10-CM

## 2021-04-29 PROCEDURE — 0012A SARS-COV-2 / COVID-19 MRNA VACCINE (MODERNA) 100 MCG: CPT

## 2021-04-29 PROCEDURE — 91301 SARS-COV-2 / COVID-19 MRNA VACCINE (MODERNA) 100 MCG: CPT

## 2022-02-15 ENCOUNTER — TELEPHONE (OUTPATIENT)
Dept: ADMINISTRATIVE | Facility: OTHER | Age: 52
End: 2022-02-15

## 2022-02-15 NOTE — TELEPHONE ENCOUNTER
----- Message from Elliott Holstein sent at 2/14/2022  6:25 AM EST -----  Regarding: CARE GAP REQUEST  Contact: [phone]  02/14/22 6:25 AM    Hello, our patient oTni Bach has had Mammogram completed/performed  Please assist in updating the patient chart by pulling the document from the Media Tab  The date of service is [id number]       Thank you,  Elliott Holstein  [facility]

## 2022-02-17 NOTE — TELEPHONE ENCOUNTER
Upon review of the In Basket request we were able to locate, review, and update the patient chart as requested for Mammogram     Any additional questions or concerns should be emailed to the Practice Liaisons via [email or web address] email, please do not reply via In Basket      Thank you  Chris Reeves

## 2022-03-21 ENCOUNTER — OFFICE VISIT (OUTPATIENT)
Dept: FAMILY MEDICINE CLINIC | Facility: CLINIC | Age: 52
End: 2022-03-21
Payer: COMMERCIAL

## 2022-03-21 VITALS
WEIGHT: 138.4 LBS | HEIGHT: 64 IN | TEMPERATURE: 96 F | OXYGEN SATURATION: 99 % | DIASTOLIC BLOOD PRESSURE: 82 MMHG | BODY MASS INDEX: 23.63 KG/M2 | SYSTOLIC BLOOD PRESSURE: 126 MMHG | HEART RATE: 72 BPM

## 2022-03-21 DIAGNOSIS — Z00.00 WELL ADULT EXAM: Primary | ICD-10-CM

## 2022-03-21 PROCEDURE — 99396 PREV VISIT EST AGE 40-64: CPT | Performed by: FAMILY MEDICINE

## 2022-03-21 PROCEDURE — 3725F SCREEN DEPRESSION PERFORMED: CPT | Performed by: FAMILY MEDICINE

## 2022-03-21 PROCEDURE — 3008F BODY MASS INDEX DOCD: CPT | Performed by: FAMILY MEDICINE

## 2022-03-21 PROCEDURE — 1036F TOBACCO NON-USER: CPT | Performed by: FAMILY MEDICINE

## 2022-03-21 NOTE — PROGRESS NOTES
Assessment/Plan:  Vaccines up-to-date  Labs up-to-date  Mammogram and diagnostic ultrasound done previously  Patient will follow up appropriately in this regard  Patient follow-up gynecology appropriately  Patient will be referred to GI for colonoscopy  Patient follow-up with Dermatology appropriately  Diagnoses and all orders for this visit:    Well adult exam  -     Ambulatory Referral to Gastroenterology; Future            Subjective:        Patient ID: Isabella Akins is a 54 y o  female  Patient is here for wellness exam   Labs and vaccines reviewed  Patient up-to-date with mammograms  Patient up-to-date with gynecologic evaluations  Patient's  Maternal grandmother with history of colon cancer  Patient feeling well overall  No chest pain shortness of breath or abdominal pain or problems urinating or defecating  Patient is getting q 6 months ultrasounds for left breast         The following portions of the patient's history were reviewed and updated as appropriate: allergies, current medications, past family history, past medical history, past social history, past surgical history and problem list       Review of Systems   Constitutional: Negative  HENT: Negative  Eyes: Negative  Respiratory: Negative  Cardiovascular: Negative  Gastrointestinal: Negative  Endocrine: Negative  Genitourinary: Negative  Musculoskeletal: Negative  Skin: Negative  Allergic/Immunologic: Negative  Neurological: Negative  Hematological: Negative  Psychiatric/Behavioral: Negative  Objective:        Depression Screening and Follow-up Plan: Patient was screened for depression during today's encounter   They screened negative with a PHQ-2 score of 0             /82 (BP Location: Left arm, Patient Position: Sitting, Cuff Size: Standard)   Pulse 72   Temp (!) 96 °F (35 6 °C) (Tympanic)   Ht 5' 4" (1 626 m)   Wt 62 8 kg (138 lb 6 4 oz)   LMP 03/05/2022 (Approximate)   SpO2 99%   BMI 23 76 kg/m²          Physical Exam  Vitals and nursing note reviewed  Constitutional:       General: She is not in acute distress  Appearance: Normal appearance  She is not ill-appearing, toxic-appearing or diaphoretic  HENT:      Head: Normocephalic and atraumatic  Right Ear: Tympanic membrane, ear canal and external ear normal  There is no impacted cerumen  Left Ear: Tympanic membrane, ear canal and external ear normal  There is no impacted cerumen  Nose: Nose normal  No congestion or rhinorrhea  Mouth/Throat:      Mouth: Mucous membranes are moist       Pharynx: No oropharyngeal exudate or posterior oropharyngeal erythema  Eyes:      General: No scleral icterus  Right eye: No discharge  Left eye: No discharge  Extraocular Movements: Extraocular movements intact  Conjunctiva/sclera: Conjunctivae normal       Pupils: Pupils are equal, round, and reactive to light  Neck:      Vascular: No carotid bruit  Cardiovascular:      Rate and Rhythm: Normal rate and regular rhythm  Pulses: Normal pulses  Heart sounds: Normal heart sounds  No murmur heard  No friction rub  No gallop  Pulmonary:      Effort: Pulmonary effort is normal  No respiratory distress  Breath sounds: Normal breath sounds  No stridor  No wheezing, rhonchi or rales  Chest:      Chest wall: No tenderness  Abdominal:      General: Abdomen is flat  Bowel sounds are normal  There is no distension  Palpations: Abdomen is soft  Tenderness: There is no abdominal tenderness  There is no guarding or rebound  Musculoskeletal:         General: No swelling, tenderness, deformity or signs of injury  Normal range of motion  Cervical back: Normal range of motion and neck supple  No rigidity  No muscular tenderness  Right lower leg: No edema  Left lower leg: No edema  Lymphadenopathy:      Cervical: No cervical adenopathy  Skin:     General: Skin is warm and dry  Capillary Refill: Capillary refill takes less than 2 seconds  Coloration: Skin is not jaundiced  Findings: No bruising, erythema, lesion or rash  Neurological:      Mental Status: She is alert and oriented to person, place, and time  Mental status is at baseline  Cranial Nerves: No cranial nerve deficit  Sensory: No sensory deficit  Motor: No weakness  Coordination: Coordination normal       Gait: Gait normal    Psychiatric:         Mood and Affect: Mood normal          Behavior: Behavior normal          Thought Content:  Thought content normal          Judgment: Judgment normal

## 2022-06-21 ENCOUNTER — TELEPHONE (OUTPATIENT)
Dept: GASTROENTEROLOGY | Facility: CLINIC | Age: 52
End: 2022-06-21

## 2022-06-21 NOTE — TELEPHONE ENCOUNTER
Scheduled date of colonoscopy (as of today):09 02 22  Physician performing colonoscopy:DR HESTER  [facility] of colonoscopy[facility]  Bowel prep reviewed with patient:DULCOLAX/MIRALAX  Instructions reviewed with patient by:PARAMJIT VERBALLY/MAILED  Clearances: N/A    06/21/22  Screened by: Alfredo Cooper    Referring Provider BARB JIMÉNEZ    Pre- Screening: Body mass index is 23 76 kg/m²  Has patient been referred for a routine screening Colonoscopy? yes  Is the patient between 39-70 years old? yes      Previous Colonoscopy yes   If yes:    Date: [place]:     Reason:       SCHEDULING STAFF: If the patient is between 45yrs-49yrs, please advise patient to confirm benefits/coverage with their insurance company for a routine screening colonoscopy, some insurance carriers will only cover at [address] or older  If the patient is over 66years old, please schedule an office visit  Does the patient want to see a Gastroenterologist prior to their procedure OR are they having any GI symptoms? no    Has the patient been hospitalized or had abdominal surgery in the past 6 months? no    Does the patient use supplemental oxygen? no    Does the patient take Coumadin, Lovenox, Plavix, Elliquis, Xarelto, or other blood thinning medication? no    Has the patient had a stroke, cardiac event, or stent placed in the past year? no    SCHEDULING STAFF: If patient answers NO to above questions, then schedule procedure  If patient answers YES to above questions, then schedule office appointment  If patient is between 45yrs - 49yrs, please advise patient that we will have to confirm benefits & coverage with their insurance company for a routine screening colonoscopy

## 2022-08-11 ENCOUNTER — TELEPHONE (OUTPATIENT)
Dept: GASTROENTEROLOGY | Facility: CLINIC | Age: 52
End: 2022-08-11

## 2022-08-11 NOTE — TELEPHONE ENCOUNTER
Patients GI provider:  Dr Osmar Kennedy    Number to return call: [phone]    Reason for call: Pt calling to reschedule colonoscopy    Scheduled procedure/appointment date if applicable: Procedure 1/6/10

## 2022-08-12 NOTE — TELEPHONE ENCOUNTER
Pt rescheduled colonoscopy on 10/26 at [facility] with [address]  Pt confirmed she has prep instructions

## 2022-08-25 ENCOUNTER — OFFICE VISIT (OUTPATIENT)
Dept: URGENT CARE | Facility: MEDICAL CENTER | Age: 52
End: 2022-08-25
Payer: COMMERCIAL

## 2022-08-25 VITALS
SYSTOLIC BLOOD PRESSURE: 149 MMHG | RESPIRATION RATE: 16 BRPM | HEART RATE: 76 BPM | DIASTOLIC BLOOD PRESSURE: 78 MMHG | TEMPERATURE: 97 F | OXYGEN SATURATION: 100 %

## 2022-08-25 DIAGNOSIS — L24.7 IRRITANT CONTACT DERMATITIS DUE TO PLANTS, EXCEPT FOOD: Primary | ICD-10-CM

## 2022-08-25 PROCEDURE — 99212 OFFICE O/P EST SF 10 MIN: CPT | Performed by: PHYSICIAN ASSISTANT

## 2022-08-25 RX ORDER — PREDNISONE 50 MG/1
50 TABLET ORAL DAILY
Qty: 5 TABLET | Refills: 0 | Status: SHIPPED | OUTPATIENT
Start: 2022-08-25 | End: 2022-08-30

## 2022-08-26 NOTE — PATIENT INSTRUCTIONS
take prednisone as directed until completed   Benadryl as needed for itching  Follow up with PCP in 3-5 days  Proceed to  ER if symptoms worsen  Contact Dermatitis   AMBULATORY CARE:   Contact dermatitis  is a rash  It develops when you touch something that irritates your skin or causes an allergic reaction  Common signs and symptoms include the following:   Red, swollen, painful rash    Skin that itches, stings, or burns    Dry, scaly, or crusty skin patches    Bumps or blisters    Fluid draining from blisters    Call your local emergency number (911 in the [address] [facility]) if:   You have sudden trouble breathing  Your throat swells and you have trouble eating  Your face is swollen  Call your doctor or dermatologist if:   You have a fever  Your blisters are draining pus  Your rash spreads or does not get better, even after treatment  You have questions or concerns about your condition or care  Treatment for contact dermatitis  involves removing any irritants or allergens that cause your rash  You may also need medicines to decrease itching and swelling  They will be given as a topical medicine to apply to your rash or as a pill  Manage contact dermatitis:   Take short baths or showers in cool water  Use mild soap or soap-free cleansers  Add oatmeal, baking soda, or cornstarch to the bath water to help decrease skin irritation  Avoid skin irritants  Examples include makeup, hair products, soaps, and cleansers  Use products that do not contain a scent or dye  Apply a cool compress to your rash  This will help soothe your skin  Apply lotions or creams to the area  These help keep your skin moist and decrease itching  Apply the lotion or cream right after a lukewarm bath or shower when your skin is still damp  Use products that do not contain a scent  Follow up with your doctor or dermatologist in 2 to 3 days:  Write down your questions so you remember to ask them during your visits    © Copyright [employer] 2022 Information is for End User's use only and may not be sold, redistributed or otherwise used for commercial purposes  All illustrations and images included in CareNotes® are the copyrighted property of [employer] D [employer] M [employer] Inc  or Kailee Chapa  The above information is an  only  It is not intended as medical advice for individual conditions or treatments  Talk to your doctor, nurse or pharmacist before following any medical regimen to see if it is safe and effective for you

## 2022-08-26 NOTE — PROGRESS NOTES
[facility]        NAME: Russell Carr is a 50 y o  female  : 1970    MRN: [id number]  DATE: 2022  TIME: 10:09 AM    Assessment and Plan   Irritant contact dermatitis due to plants, except food [L24 7]  1  Irritant contact dermatitis due to plants, except food  predniSONE 50 mg tablet         Patient Instructions      take prednisone as directed until completed   Benadryl as needed for itching  Follow up with PCP in 3-5 days  Proceed to  ER if symptoms worsen  Chief Complaint     Chief Complaint   Patient presents with    Rash     Itchy body rash x last week  One area on her lower abdomen is growing in size and feels irritating  History of Present Illness       68-year-old female presents with rash for 1 week  Patient thinks she was exposed to some poison ivy  Reports that she has on her arms legs abdomen neck  Denies any swelling of lips tongue throat or mouth  No problems with breathing chest pain shortness of breath  No abdominal pain nausea vomiting diarrhea  Denies any fevers or chills  Rash  This is a new problem  The current episode started yesterday  The problem has been waxing and waning since onset  The rash is diffuse  The problem is mild  The rash is characterized by blistering, itchiness, redness and draining  She was exposed to poison ivy/oak  The rash first occurred at home  Associated symptoms include itching  Pertinent negatives include no cough, fatigue, fever, rhinorrhea or shortness of breath  Past treatments include nothing  The treatment provided no relief  There were no sick contacts  Review of Systems   Review of Systems   Constitutional: Negative  Negative for fatigue and fever  HENT: Negative  Negative for rhinorrhea  Eyes: Negative  Respiratory: Negative  Negative for cough and shortness of breath  Cardiovascular: Negative  Gastrointestinal: Negative  Musculoskeletal: Negative      Skin: Positive for itching and rash  Neurological: Negative  Current Medications       Current Outpatient Medications:     predniSONE 50 mg tablet, Take 1 tablet (50 mg total) by mouth daily for 5 days, Disp: 5 tablet, Rfl: 0    Current Allergies     Allergies as of 2022 - Reviewed 2022   Allergen Reaction Noted    Amoxicillin  2012    Penicillins  2012    Sulfa antibiotics  2012    Sulfasalazine  2012            The following portions of the patient's history were reviewed and updated as appropriate: allergies, current medications, past family history, past medical history, past social history, past surgical history and problem list      History reviewed  No pertinent past medical history  Past Surgical History:   Procedure Laterality Date     SECTION      COLONOSCOPY         Family History   Problem Relation Age of Onset    Hypertension Mother          Medications have been verified  Objective   /78   Pulse 76   Temp (!) 97 °F (36 1 °C)   Resp 16   SpO2 100%   No LMP recorded  Physical Exam     Physical Exam  Vitals and nursing note reviewed  Constitutional:       General: She is not in acute distress  Appearance: Normal appearance  She is well-developed  HENT:      Head: Normocephalic and atraumatic  Right Ear: Hearing, tympanic membrane, ear canal and external ear normal  There is no impacted cerumen  Left Ear: Hearing, tympanic membrane, ear canal and external ear normal  There is no impacted cerumen  Nose: Nose normal       Mouth/Throat:      Pharynx: Uvula midline  No oropharyngeal exudate  Eyes:      General:         Right eye: No discharge  Left eye: No discharge  Conjunctiva/sclera: Conjunctivae normal    Cardiovascular:      Rate and Rhythm: Normal rate and regular rhythm  Heart sounds: Normal heart sounds  No murmur heard  Pulmonary:      Effort: Pulmonary effort is normal  No respiratory distress  Breath sounds: Normal breath sounds  No wheezing or rales  Abdominal:      General: Bowel sounds are normal       Palpations: Abdomen is soft  Tenderness: There is no abdominal tenderness  Musculoskeletal:         General: Normal range of motion  Cervical back: Normal range of motion and neck supple  Lymphadenopathy:      Cervical: No cervical adenopathy  Skin:     General: Skin is warm and dry  Findings: Rash (Linear vesicular rash noted on multiple areas on the body ) present  Rash is vesicular  Neurological:      Mental Status: She is alert and oriented to person, place, and time     Psychiatric:         Mood and Affect: Mood normal

## 2022-10-03 NOTE — TELEPHONE ENCOUNTER
Rescheduled for 11/08/22 @[facility] with Dr Enrique Castellon  Pt confirmed she has all prep instructions

## 2022-11-07 RX ORDER — SODIUM CHLORIDE 9 MG/ML
125 INJECTION, SOLUTION INTRAVENOUS CONTINUOUS
Status: CANCELLED | OUTPATIENT
Start: 2022-11-07

## 2022-11-08 ENCOUNTER — HOSPITAL ENCOUNTER (OUTPATIENT)
Dept: GASTROENTEROLOGY | Facility: MEDICAL CENTER | Age: 52
Setting detail: OUTPATIENT SURGERY
Discharge: HOME | End: 2022-11-08

## 2022-11-08 ENCOUNTER — ANESTHESIA EVENT (OUTPATIENT)
Dept: GASTROENTEROLOGY | Facility: MEDICAL CENTER | Age: 52
End: 2022-11-08

## 2022-11-08 ENCOUNTER — ANESTHESIA (OUTPATIENT)
Dept: GASTROENTEROLOGY | Facility: MEDICAL CENTER | Age: 52
End: 2022-11-08

## 2022-11-08 VITALS
DIASTOLIC BLOOD PRESSURE: 72 MMHG | HEART RATE: 72 BPM | RESPIRATION RATE: 16 BRPM | HEIGHT: 64 IN | SYSTOLIC BLOOD PRESSURE: 123 MMHG | TEMPERATURE: 97.1 F | BODY MASS INDEX: 23.56 KG/M2 | OXYGEN SATURATION: 100 % | WEIGHT: 138 LBS

## 2022-11-08 DIAGNOSIS — Z12.11 SPECIAL SCREENING FOR MALIGNANT NEOPLASMS, COLON: ICD-10-CM

## 2022-11-08 LAB
EXT PREGNANCY TEST URINE: NEGATIVE
EXT. CONTROL: NORMAL

## 2022-11-08 RX ORDER — PROPOFOL 10 MG/ML
INJECTION, EMULSION INTRAVENOUS AS NEEDED
Status: DISCONTINUED | OUTPATIENT
Start: 2022-11-08 | End: 2022-11-08

## 2022-11-08 RX ORDER — SODIUM CHLORIDE 9 MG/ML
125 INJECTION, SOLUTION INTRAVENOUS CONTINUOUS
Status: DISCONTINUED | OUTPATIENT
Start: 2022-11-08 | End: 2022-11-12 | Stop reason: HOSPADM

## 2022-11-08 RX ORDER — LIDOCAINE HYDROCHLORIDE 20 MG/ML
INJECTION, SOLUTION EPIDURAL; INFILTRATION; INTRACAUDAL; PERINEURAL AS NEEDED
Status: DISCONTINUED | OUTPATIENT
Start: 2022-11-08 | End: 2022-11-08

## 2022-11-08 RX ADMIN — SODIUM CHLORIDE 125 ML/HR: 0.9 INJECTION, SOLUTION INTRAVENOUS at 09:30

## 2022-11-08 RX ADMIN — PROPOFOL 50 MG: 10 INJECTION, EMULSION INTRAVENOUS at 09:43

## 2022-11-08 RX ADMIN — PROPOFOL 100 MG: 10 INJECTION, EMULSION INTRAVENOUS at 09:37

## 2022-11-08 RX ADMIN — LIDOCAINE HYDROCHLORIDE 60 MG: 20 INJECTION, SOLUTION EPIDURAL; INFILTRATION; INTRACAUDAL; PERINEURAL at 09:37

## 2022-11-08 RX ADMIN — PROPOFOL 50 MG: 10 INJECTION, EMULSION INTRAVENOUS at 09:40

## 2022-11-08 RX ADMIN — PROPOFOL 50 MG: 10 INJECTION, EMULSION INTRAVENOUS at 09:45

## 2022-11-08 RX ADMIN — PROPOFOL 50 MG: 10 INJECTION, EMULSION INTRAVENOUS at 09:38

## 2022-11-08 NOTE — ANESTHESIA POSTPROCEDURE EVALUATION
Post-Op Assessment Note    CV Status:  Stable    Pain management: adequate     Mental Status:  Alert and awake   Hydration Status:  Euvolemic   PONV Controlled:  Controlled   Airway Patency:  Patent      Post Op Vitals Reviewed: Yes      Staff: Anesthesiologist         No complications documented      BP      Temp     Pulse     Resp      SpO2      /72   Pulse 72   Temp (!) 97 1 °F (36 2 °C) (Temporal)   Resp 16   Ht 5' 4" (1 626 m)   Wt 62 6 kg (138 lb)   SpO2 100%   BMI 23 69 kg/m²

## 2022-11-08 NOTE — ANESTHESIA PREPROCEDURE EVALUATION
Procedure:  COLONOSCOPY    Relevant Problems   ANESTHESIA (within normal limits)      CARDIO   (+) Menstrual migraine without status migrainosus, not intractable      MUSCULOSKELETAL   (+) Cervical strain   (+) Lumbar strain   (+) Thoracic myofascial strain      NEURO/PSYCH   (+) Menstrual migraine without status migrainosus, not intractable        Physical Exam    Airway    Mallampati score: I  TM Distance: >3 FB  Neck ROM: full     Dental       Cardiovascular  Rhythm: regular, Rate: normal,     Pulmonary  Breath sounds clear to auscultation,     Other Findings        Anesthesia Plan  ASA Score- 2     Anesthesia Type- IV sedation with anesthesia with ASA Monitors  Additional Monitors:   Airway Plan:           Plan Factors-Exercise tolerance (METS): >4 METS  Chart reviewed  Patient summary reviewed  Patient is not a current smoker  Induction- intravenous  Postoperative Plan-     Informed Consent- Anesthetic plan and risks discussed with patient

## 2022-11-08 NOTE — H&P
History and Physical -  [facility]  Elizabeth Gonsalves 50 y o  female MRN: [id number]                  HPI: Elizabeth Gonsalves is a 50y o  year old female who presents for colonoscopy for CRC screening    REVIEW OF SYSTEMS: Per the HPI, and otherwise unremarkable  Historical Information   History reviewed  No pertinent past medical history  Past Surgical History:   Procedure Laterality Date   •  SECTION     • COLONOSCOPY       Social History   Social History     Substance and Sexual Activity   Alcohol Use Yes    Comment: Social     Social History     Substance and Sexual Activity   Drug Use No     Social History     Tobacco Use   Smoking Status Never Smoker   Smokeless Tobacco Never Used     Family History   Problem Relation Age of Onset   • Hypertension Mother        Meds/Allergies     No current outpatient medications on file  Allergies   Allergen Reactions   • Amoxicillin    • Penicillins    • Sulfa Antibiotics    • Sulfasalazine        Objective     /77   Pulse 76   Temp (!) 97 1 °F (36 2 °C) (Temporal)   Resp 18   SpO2 100%       PHYSICAL EXAM    Gen: NAD  Head: NCAT  CV: RRR  CHEST: Clear  ABD: soft, NT/ND  EXT: no edema      ASSESSMENT/PLAN:  This is a 50y o  year old female here for colonoscopy, and she is stable and optimized for her procedure

## 2022-11-25 ENCOUNTER — AMB VIDEO VISIT (OUTPATIENT)
Dept: OTHER | Facility: HOSPITAL | Age: 52
End: 2022-11-25

## 2022-11-25 VITALS — RESPIRATION RATE: 16 BRPM

## 2022-11-25 DIAGNOSIS — J01.90 ACUTE SINUSITIS, RECURRENCE NOT SPECIFIED, UNSPECIFIED LOCATION: Primary | ICD-10-CM

## 2022-11-25 RX ORDER — DOXYCYCLINE 100 MG/1
100 TABLET ORAL 2 TIMES DAILY
Qty: 14 TABLET | Refills: 0 | Status: SHIPPED | OUTPATIENT
Start: 2022-11-25 | End: 2022-12-02

## 2022-11-25 NOTE — PATIENT INSTRUCTIONS
Start antibiotic  Take probiotic  Over the counter cough and cold as needed  Nasal saline flushes can be helpful  Mucinex can also be helpful  Follow up with PCP if no improvement  Go to the ER with any worsening symptoms, chest pain, shortness breast or difficulty breathing

## 2022-11-25 NOTE — PROGRESS NOTES
Video Visit - Nav Buenrostro 50 y o  female MRN: [id number]    REQUIRED DOCUMENTATION:         1  [facility]  2  Provider located at [address] [place] [id number] [phone]  3  [facility] provider: MARIYA Pennington  4  Identify all parties in room with patient during [facility] visit:  Patient   5  After connecting through [employer], patient was identified by name and date of birth  Patient was then informed that this was a Telemedicine visit and that the exam was being conducted confidentially over secure lines  My office door was closed  No one else was in the room  Patient acknowledged consent and understanding of privacy and security of the Telemedicine visit  I informed the patient that I have reviewed their record in Epic and presented the opportunity for them to ask any questions regarding the visit today  The patient agreed to participate  This is a 50year old female here today for video  She started with sore throat, sinus pressure and sore throat about 6 days ago  She states she started to feel better but end of day yesterday she started to feel worse  She states congestion increased  She is having green mucous  She did home a covid test which was negative  NO chest pain or sob  She is eating and drinking okay  Review of Systems   Constitutional: Negative for activity change, chills, fatigue and fever  HENT: Positive for congestion, rhinorrhea, sinus pressure and sinus pain  Respiratory: Positive for cough  Negative for shortness of breath  Cardiovascular: Negative  Neurological: Negative  Vitals:    11/25/22 0915   Resp: 16     Physical Exam  HENT:      Head: Normocephalic and atraumatic  Comments: Sinus pressure      Nose: Congestion present  Eyes:      Conjunctiva/sclera: Conjunctivae normal    Pulmonary:      Effort: Pulmonary effort is normal  No respiratory distress        Comments: No cough or audible wheezing   Skin:     Comments: No rash on head or neck  Neurological:      Mental Status: She is oriented to person, place, and time  Psychiatric:         Mood and Affect: Mood normal          Behavior: Behavior normal          Thought Content: Thought content normal          Judgment: Judgment normal        Diagnoses and all orders for this visit:    Acute sinusitis, recurrence not specified, unspecified location  -     doxycycline (ADOXA) 100 MG tablet; Take 1 tablet (100 mg total) by mouth 2 (two) times a day for 7 days      Patient Instructions   Start antibiotic  Take probiotic  Over the counter cough and cold as needed  Nasal saline flushes can be helpful  Mucinex can also be helpful  Follow up with PCP if no improvement  Go to the ER with any worsening symptoms, chest pain, shortness breast or difficulty breathing  Follow up with PCP if not improved, if symptoms are worse, go to the ER

## 2022-11-25 NOTE — CARE ANYWHERE EVISITS
Visit Summary for Daniela Quintanillater   Deluc Tracy - Gender: Female - Date of Birth: [id number]  Date: [id number] - Duration: 6 minutes  Patient: Daniela Molina  Provider: Gabo MERAZ    Patient Contact Information  Address  [place]; [address]  [phone]    Visit Topics  Sinus pressure and cough [Added By: Self - 2022-11-25]    Triage Questions   What is your current physical address in the event of a medical emergency? Answer []  Are you allergic to any medications? Answer []  Are you now or could you be pregnant? Answer []  Do you have any immune system compromise or chronic lung   disease? Answer []  Do you have any vulnerable family members in the home (infant, pregnant, cancer, elderly)? Answer []     Conversation Transcripts  [0A][0A] [Notification] You are connected with Rachna Garcia, Urgent Care Specialist [0A][Notification] Ashish Hobbs is located in [place]  [0A][Notification] Ashish Hobbs has shared health history  Philip Li  [0A]    Diagnosis  Acute pansinusitis    Procedures  Value: [id number] Code: CPT-4 UNLISTED E&M SERVICE    Medications Prescribed    No prescriptions ordered    Electronically signed by: Rachna Marie(NPI [id number])

## 2022-11-29 ENCOUNTER — AMB VIDEO VISIT (OUTPATIENT)
Dept: OTHER | Facility: HOSPITAL | Age: 52
End: 2022-11-29

## 2022-11-29 DIAGNOSIS — J01.00 ACUTE NON-RECURRENT MAXILLARY SINUSITIS: Primary | ICD-10-CM

## 2022-11-29 PROBLEM — T63.441A BEE STING: Status: RESOLVED | Noted: 2018-09-21 | Resolved: 2022-11-29

## 2022-11-29 PROBLEM — S29.019A THORACIC MYOFASCIAL STRAIN: Status: RESOLVED | Noted: 2018-06-22 | Resolved: 2022-11-29

## 2022-11-29 PROBLEM — S39.012A LUMBAR STRAIN: Status: RESOLVED | Noted: 2018-06-22 | Resolved: 2022-11-29

## 2022-11-29 PROBLEM — B36.0 TINEA VERSICOLOR: Status: RESOLVED | Noted: 2020-09-09 | Resolved: 2022-11-29

## 2022-11-29 PROBLEM — Z00.00 WELL ADULT EXAM: Status: RESOLVED | Noted: 2020-03-10 | Resolved: 2022-11-29
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
## 2022-11-29 PROBLEM — S16.1XXA CERVICAL STRAIN: Status: RESOLVED | Noted: 2018-06-22 | Resolved: 2022-11-29

## 2022-11-29 RX ORDER — PREDNISONE 20 MG/1
40 TABLET ORAL DAILY
Qty: 3 TABLET | Refills: 0 | Status: SHIPPED | OUTPATIENT
Start: 2022-11-29

## 2022-11-29 RX ORDER — BENZONATATE 200 MG/1
200 CAPSULE ORAL 3 TIMES DAILY PRN
Qty: 20 CAPSULE | Refills: 0 | Status: SHIPPED | OUTPATIENT
Start: 2022-11-29

## 2022-11-29 RX ORDER — GUAIFENESIN 600 MG/1
1200 TABLET, EXTENDED RELEASE ORAL EVERY 12 HOURS SCHEDULED
COMMUNITY
End: 2022-12-01

## 2022-11-29 RX ORDER — PSEUDOEPHEDRINE HCL 30 MG
60 TABLET ORAL EVERY 6 HOURS PRN
Qty: 30 TABLET | Refills: 0 | Status: SHIPPED | OUTPATIENT
Start: 2022-11-29

## 2022-11-29 NOTE — PATIENT INSTRUCTIONS
Go to ER for evaluation for worsening muscle weakness or numbness or tingling  See your PCP tomorrow    Sinusitis   WHAT YOU NEED TO KNOW:   Sinusitis is inflammation or infection of your sinuses  Sinusitis is most often caused by a virus  Acute sinusitis may last up to 12 weeks  Chronic sinusitis lasts longer than 12 weeks  Recurrent sinusitis means you have 4 or more infections in 1 year  DISCHARGE INSTRUCTIONS:   Return to the emergency department if:   You have trouble breathing or wheezing that is getting worse  You have a stiff neck, a fever, or a bad headache  You cannot open your eye  Your eyeball bulges out or you cannot move your eye  You are more sleepy than normal, or you notice changes in your ability to think, move, or talk  You have swelling of your forehead or scalp  Call your doctor if:   You have vision changes, such as double vision  Your eye and eyelid are red, swollen, and painful  Your symptoms do not improve or go away after 10 days  You have nausea and are vomiting  Your nose is bleeding  You have questions or concerns about your condition or care  Medicines: Your symptoms may go away on their own  Your healthcare provider may recommend watchful waiting for up to 10 days before starting antibiotics  You may need any of the following:  Acetaminophen  decreases pain and fever  It is available without a doctor's order  Ask how much to take and how often to take it  Follow directions  Read the labels of all other medicines you are using to see if they also contain acetaminophen, or ask your doctor or pharmacist  Acetaminophen can cause liver damage if not taken correctly  Do not use more than 4 grams (4,000 milligrams) total of acetaminophen in one day  NSAIDs , such as ibuprofen, help decrease swelling, pain, and fever  This medicine is available with or without a doctor's order   NSAIDs can cause stomach bleeding or kidney problems in certain people  If you take blood thinner medicine, always ask your healthcare provider if NSAIDs are safe for you  Always read the medicine label and follow directions  Nasal steroid sprays  may help decrease inflammation in your nose and sinuses  Decongestants  help reduce swelling and drain mucus in the nose and sinuses  They may help you breathe easier  Antihistamines  help dry mucus in the nose and relieve sneezing  Antibiotics  help treat or prevent a bacterial infection  Take your medicine as directed  Contact your healthcare provider if you think your medicine is not helping or if you have side effects  Tell him or her if you are allergic to any medicine  Keep a list of the medicines, vitamins, and herbs you take  Include the amounts, and when and why you take them  Bring the list or the pill bottles to follow-up visits  Carry your medicine list with you in case of an emergency  Self-care:   Rinse your sinuses as directed  Use a sinus rinse device to rinse your nasal passages with a saline (salt water) solution or distilled water  Do not use tap water  This will help thin the mucus in your nose and rinse away pollen and dirt  It will also help reduce swelling so you can breathe normally  Use a humidifier  to increase air moisture in your home  This may make it easier for you to breathe and help decrease your cough  Sleep with your head elevated  Place an extra pillow under your head before you go to sleep to help your sinuses drain  Drink liquids as directed  Ask your healthcare provider how much liquid to drink each day and which liquids are best for you  Liquids will thin the mucus in your nose and help it drain  Avoid drinks that contain alcohol or caffeine  Do not smoke, and avoid secondhand smoke  Nicotine and other chemicals in cigarettes and cigars can make your symptoms worse  Ask your healthcare provider for information if you currently smoke and need help to quit  E-cigarettes or smokeless tobacco still contain nicotine  Talk to your healthcare provider before you use these products  Prevent the spread of germs:   Wash your hands often with soap and water  Wash your hands after you use the bathroom, change a child's diaper, or sneeze  Wash your hands before you prepare or eat food  Stay away from people who are sick  Some germs spread easily and quickly through contact  Follow up with your doctor as directed: You may be referred to an ear, nose, and throat specialist  Write down your questions so you remember to ask them during your visits  © Copyright [employer] 2022 Information is for End User's use only and may not be sold, redistributed or otherwise used for commercial purposes  All illustrations and images included in CareNotes® are the copyrighted property of [employer]  or Kailee Chapa  The above information is an  only  It is not intended as medical advice for individual conditions or treatments  Talk to your doctor, nurse or pharmacist before following any medical regimen to see if it is safe and effective for you

## 2022-11-29 NOTE — PROGRESS NOTES
Video Visit - Cherylene Guadalajara 50 y o  female MRN: [id number]    REQUIRED DOCUMENTATION:         1  [facility]  2  Provider located at [address] [place] [id number] [phone]  3  [facility] provider: Rachel Lin PA-C   4  Identify all parties in room with patient during [facility] visit:  No one else  5  After connecting through televideo, patient was identified by name and date of birth  Patient was then informed that this was a Telemedicine visit and that the exam was being conducted confidentially over secure lines  My office door was closed  No one else was in the room  Patient acknowledged consent and understanding of privacy and security of the Telemedicine visit  I informed the patient that I have reviewed their record in Epic and presented the opportunity for them to ask any questions regarding the visit today  The patient agreed to participate  VITALS: Heart Rate: 96 BPM, Respiratory Rate: 12 RPM, Temperature Unavailable° F, Blood Pressure Unavailable mmHg, Pulse Ox Unavailable % on RA    HPI  PT reports she is on doxycycline since Friday without improvement in sx  Reports pain in maxilla, ear and coughing spells  Sx since 8 days ago  Taking mucinex without relief  Makes a side note that she feels like her calf muscles are weak, she thinks from sitting around  No numbness   Physical Exam  Constitutional:       General: She is not in acute distress  Appearance: Normal appearance  She is not toxic-appearing  HENT:      Head: Normocephalic and atraumatic  Nose: No rhinorrhea  Mouth/Throat:      Mouth: Mucous membranes are moist    Eyes:      Conjunctiva/sclera: Conjunctivae normal       Comments: glasses   Cardiovascular:      Rate and Rhythm: Normal rate  Pulmonary:      Effort: Pulmonary effort is normal  No respiratory distress  Breath sounds: No wheezing (no gross audible wheeze through computer)  Musculoskeletal:      Cervical back: Normal range of motion  Comments: Able to heel walk and toe walk   Skin:     Findings: No rash (on face or neck)  Neurological:      Mental Status: She is alert  Cranial Nerves: No dysarthria or facial asymmetry  Psychiatric:         Mood and Affect: Mood normal          Behavior: Behavior normal        Sent message to PCP recommend in person evaluation in next 24 hours to r/o guillain-barré syndrome, though feel this is unlikely  Diagnoses and all orders for this visit:    Acute non-recurrent maxillary sinusitis  -     pseudoephedrine (SUDAFED) 30 mg tablet; Take 2 tablets (60 mg total) by mouth every 6 (six) hours as needed for congestion  -     predniSONE 20 mg tablet; Take 2 tablets (40 mg total) by mouth daily  -     benzonatate (TESSALON) 200 MG capsule; Take 1 capsule (200 mg total) by mouth 3 (three) times a day as needed for cough      Patient Instructions     Go to ER for evaluation for worsening muscle weakness or numbness or tingling  See your PCP tomorrow    Sinusitis   WHAT YOU NEED TO KNOW:   Sinusitis is inflammation or infection of your sinuses  Sinusitis is most often caused by a virus  Acute sinusitis may last up to 12 weeks  Chronic sinusitis lasts longer than 12 weeks  Recurrent sinusitis means you have 4 or more infections in 1 year  DISCHARGE INSTRUCTIONS:   Return to the emergency department if:   · You have trouble breathing or wheezing that is getting worse  · You have a stiff neck, a fever, or a bad headache  · You cannot open your eye  · Your eyeball bulges out or you cannot move your eye  · You are more sleepy than normal, or you notice changes in your ability to think, move, or talk  · You have swelling of your forehead or scalp  Call your doctor if:   · You have vision changes, such as double vision  · Your eye and eyelid are red, swollen, and painful       · Your symptoms do not improve or go away after 10 days  · You have nausea and are vomiting  · Your nose is bleeding  · You have questions or concerns about your condition or care  Medicines: Your symptoms may go away on their own  Your healthcare provider may recommend watchful waiting for up to 10 days before starting antibiotics  You may need any of the following:  · Acetaminophen  decreases pain and fever  It is available without a doctor's order  Ask how much to take and how often to take it  Follow directions  Read the labels of all other medicines you are using to see if they also contain acetaminophen, or ask your doctor or pharmacist  Acetaminophen can cause liver damage if not taken correctly  Do not use more than 4 grams (4,000 milligrams) total of acetaminophen in one day  · NSAIDs , such as ibuprofen, help decrease swelling, pain, and fever  This medicine is available with or without a doctor's order  NSAIDs can cause stomach bleeding or kidney problems in certain people  If you take blood thinner medicine, always ask your healthcare provider if NSAIDs are safe for you  Always read the medicine label and follow directions  · Nasal steroid sprays  may help decrease inflammation in your nose and sinuses  · Decongestants  help reduce swelling and drain mucus in the nose and sinuses  They may help you breathe easier  · Antihistamines  help dry mucus in the nose and relieve sneezing  · Antibiotics  help treat or prevent a bacterial infection  · Take your medicine as directed  Contact your healthcare provider if you think your medicine is not helping or if you have side effects  Tell him or her if you are allergic to any medicine  Keep a list of the medicines, vitamins, and herbs you take  Include the amounts, and when and why you take them  Bring the list or the pill bottles to follow-up visits  Carry your medicine list with you in case of an emergency  Self-care:   · Rinse your sinuses as directed    Use a sinus rinse device to rinse your nasal passages with a saline (salt water) solution or distilled water  Do not use tap water  This will help thin the mucus in your nose and rinse away pollen and dirt  It will also help reduce swelling so you can breathe normally  · Use a humidifier  to increase air moisture in your home  This may make it easier for you to breathe and help decrease your cough  · Sleep with your head elevated  Place an extra pillow under your head before you go to sleep to help your sinuses drain  · Drink liquids as directed  Ask your healthcare provider how much liquid to drink each day and which liquids are best for you  Liquids will thin the mucus in your nose and help it drain  Avoid drinks that contain alcohol or caffeine  · Do not smoke, and avoid secondhand smoke  Nicotine and other chemicals in cigarettes and cigars can make your symptoms worse  Ask your healthcare provider for information if you currently smoke and need help to quit  E-cigarettes or smokeless tobacco still contain nicotine  Talk to your healthcare provider before you use these products  Prevent the spread of germs:   · Wash your hands often with soap and water  Wash your hands after you use the bathroom, change a child's diaper, or sneeze  Wash your hands before you prepare or eat food  · Stay away from people who are sick  Some germs spread easily and quickly through contact  Follow up with your doctor as directed: You may be referred to an ear, nose, and throat specialist  Write down your questions so you remember to ask them during your visits  © Copyright [employer] 2022 Information is for End User's use only and may not be sold, redistributed or otherwise used for commercial purposes  All illustrations and images included in CareNotes® are the copyrighted property of [employer]  or [facility] Kyle Mclean   The above information is an  only   It is not intended as medical advice for individual conditions or treatments  Talk to your doctor, nurse or pharmacist before following any medical regimen to see if it is safe and effective for you

## 2022-11-29 NOTE — Clinical Note
Pt without significant PMH had sinusitis x 8 days, not improving on doxy, but now c/o calf weakness (but can heel walk and toe walk)   Recommend in person evaluation in next 24 hours to r/o guillain-barré syndrome

## 2022-11-29 NOTE — CARE ANYWHERE EVISITS
Visit Summary for Neena Bellamy - Gender: Female - Date of Birth: [id number]  Date: [id number] - Duration: 12 minutes  Patient: Neena Bellamy  Provider: Fernanda Meadows PA-C    Patient Contact Information  Address  [place]; [address]  [phone]    Visit Topics  Sinus infection and cough [Added By: Self - 2022-11-29]    Triage Questions   What is your current physical address in the event of a medical emergency? Answer []  Are you allergic to any medications? Answer []  Are you now or could you be pregnant? Answer []  Do you have any immune system compromise or chronic lung   disease? Answer []  Do you have any vulnerable family members in the home (infant, pregnant, cancer, elderly)? Answer []     Conversation Transcripts  [0A][0A] [Notification] You are connected with Fernanda Meadows PA-C, Urgent Care Specialist [0A][Notification] [facility] [place]  [0A][Notification] Andree Hawley has shared health history  Pablito Hernandez  [0A]    Diagnosis  Acute maxillary sinusitis  Muscle weakness (generalized)    Procedures  Value: [id number] Code: CPT-4 UNLISTED E&M SERVICE    Medications Prescribed    No prescriptions ordered    Electronically signed by: Nannette Del Toro(NPI [id number])

## 2022-12-01 ENCOUNTER — TELEPHONE (OUTPATIENT)
Dept: ADMINISTRATIVE | Facility: OTHER | Age: 52
End: 2022-12-01

## 2022-12-01 ENCOUNTER — OFFICE VISIT (OUTPATIENT)
Dept: FAMILY MEDICINE CLINIC | Facility: CLINIC | Age: 52
End: 2022-12-01

## 2022-12-01 VITALS
BODY MASS INDEX: 24.41 KG/M2 | DIASTOLIC BLOOD PRESSURE: 82 MMHG | HEART RATE: 109 BPM | SYSTOLIC BLOOD PRESSURE: 122 MMHG | TEMPERATURE: 98.9 F | HEIGHT: 64 IN | OXYGEN SATURATION: 97 % | WEIGHT: 143 LBS

## 2022-12-01 DIAGNOSIS — J01.00 ACUTE NON-RECURRENT MAXILLARY SINUSITIS: Primary | ICD-10-CM

## 2022-12-01 DIAGNOSIS — Z12.31 BREAST CANCER SCREENING BY MAMMOGRAM: ICD-10-CM

## 2022-12-01 RX ORDER — LEVOFLOXACIN 500 MG/1
500 TABLET, FILM COATED ORAL EVERY 24 HOURS
Qty: 7 TABLET | Refills: 0 | Status: SHIPPED | OUTPATIENT
Start: 2022-12-01 | End: 2022-12-08

## 2022-12-01 NOTE — PROGRESS NOTES
Assessment/Plan:       Diagnoses and all orders for this visit:    Acute non-recurrent maxillary sinusitis  -     levofloxacin (LEVAQUIN) 500 mg tablet; Take 1 tablet (500 mg total) by mouth every 24 hours for 7 days    Breast cancer screening by mammogram            Subjective:        Patient ID: Elizabeth Gonsalves is a 50 y o  female  The patient is here with URI symptoms   Over the past week  Short is doxycycline  Patient also given steroids for 2 days  Patient completed this is morning  No true weakness of lower extremities  Lower extremities feels heavy but full range of motion  No falls  Patient get out of chairs  No significant change urination or defecation  Patient with ongoing sinus issues as well as cough sore throat pressure in left ear and change in voice  The following portions of the patient's history were reviewed and updated as appropriate: allergies, current medications, past family history, past medical history, past social history, past surgical history and problem list       Review of Systems   Constitutional: Negative  Negative for fever  HENT: Positive for postnasal drip, rhinorrhea, sinus pressure, sinus pain, sore throat and voice change  Eyes: Negative  Respiratory: Positive for cough  Cardiovascular: Negative  Gastrointestinal: Negative  Negative for nausea and vomiting  Endocrine: Negative  Genitourinary: Negative  Musculoskeletal: Positive for arthralgias  Skin: Negative  Allergic/Immunologic: Negative  Neurological: Negative  Hematological: Negative  Psychiatric/Behavioral: Negative  Objective:               /82 (BP Location: Right arm, Patient Position: Sitting, Cuff Size: Standard)   Pulse (!) 109   Temp 98 9 °F (37 2 °C) (Temporal)   Ht 5' 4" (1 626 m)   Wt 64 9 kg (143 lb)   SpO2 97%   BMI 24 55 kg/m²          Physical Exam  Vitals and nursing note reviewed     Constitutional:       General: She is not in acute distress  Appearance: Normal appearance  She is not ill-appearing, toxic-appearing or diaphoretic  HENT:      Head: Normocephalic and atraumatic  Right Ear: Tympanic membrane, ear canal and external ear normal  There is no impacted cerumen  Left Ear: Tympanic membrane, ear canal and external ear normal  There is no impacted cerumen  Nose: Rhinorrhea present  No congestion  Mouth/Throat:      Mouth: Mucous membranes are moist       Pharynx: Oropharyngeal exudate present  No posterior oropharyngeal erythema  Eyes:      General: No scleral icterus  Right eye: No discharge  Left eye: No discharge  Extraocular Movements: Extraocular movements intact  Conjunctiva/sclera: Conjunctivae normal       Pupils: Pupils are equal, round, and reactive to light  Neck:      Vascular: No carotid bruit  Cardiovascular:      Rate and Rhythm: Normal rate and regular rhythm  Pulses: Normal pulses  Heart sounds: Normal heart sounds  No murmur heard  No friction rub  No gallop  Pulmonary:      Effort: Pulmonary effort is normal  No respiratory distress  Breath sounds: Normal breath sounds  No stridor  No wheezing, rhonchi or rales  Chest:      Chest wall: No tenderness  Musculoskeletal:         General: No swelling, tenderness, deformity or signs of injury  Normal range of motion  Cervical back: Normal range of motion and neck supple  No rigidity  No muscular tenderness  Right lower leg: No edema  Left lower leg: No edema  Lymphadenopathy:      Cervical: No cervical adenopathy  Skin:     General: Skin is warm and dry  Capillary Refill: Capillary refill takes less than 2 seconds  Coloration: Skin is not jaundiced  Findings: No bruising, erythema, lesion or rash  Neurological:      Mental Status: She is alert and oriented to person, place, and time  Mental status is at baseline        Cranial Nerves: No cranial nerve deficit  Sensory: No sensory deficit  Motor: No weakness  Coordination: Coordination normal       Gait: Gait normal       Comments: Motor 5/5 bilateral lower extremities  Patellar DTR normal 3-4 bilaterally   Psychiatric:         Mood and Affect: Mood normal          Behavior: Behavior normal          Thought Content:  Thought content normal          Judgment: Judgment normal

## 2022-12-01 NOTE — TELEPHONE ENCOUNTER
----- Message from Neil Maharaj sent at 12/1/2022 10:52 AM EST -----  Regarding: mammogram  12/01/22 10:53 AM    Isaiah, our patient Loretta Landry has had Mammogram completed/performed  Please assist in updating the patient chart by pulling the Care Everywhere (CE) document  The date of service is 9/12/2022       Thank you,  Nonda Smoker  [facility]

## 2022-12-01 NOTE — TELEPHONE ENCOUNTER
Upon review of the In Basket request we were able to locate, review, and update the patient chart as requested for Mammogram     Any additional questions or concerns should be emailed to the Practice Liaisons via the appropriate education email address, please do not reply via In Basket      Thank you  Giovanni Walls

## 2023-01-30 PROBLEM — J01.00 ACUTE NON-RECURRENT MAXILLARY SINUSITIS: Status: RESOLVED | Noted: 2022-12-01 | Resolved: 2023-01-30

## 2023-03-21 ENCOUNTER — OFFICE VISIT (OUTPATIENT)
Dept: FAMILY MEDICINE CLINIC | Facility: CLINIC | Age: 53
End: 2023-03-21

## 2023-03-21 VITALS
HEIGHT: 64 IN | TEMPERATURE: 98.5 F | WEIGHT: 143.6 LBS | BODY MASS INDEX: 24.52 KG/M2 | SYSTOLIC BLOOD PRESSURE: 120 MMHG | DIASTOLIC BLOOD PRESSURE: 70 MMHG | HEART RATE: 95 BPM | OXYGEN SATURATION: 98 %

## 2023-03-21 DIAGNOSIS — Z00.00 WELL ADULT EXAM: Primary | ICD-10-CM

## 2023-03-21 DIAGNOSIS — Z23 ENCOUNTER FOR IMMUNIZATION: ICD-10-CM

## 2023-03-21 NOTE — PROGRESS NOTES
Assessment/Plan: Vaccines reviewed  Shingrix No  1 given at this time  Laboratory studies up-to-date  Patient up-to-date with colorectal screening, gynecologic care as well as mammograms  Patient will continue exercise as well as diet  Diagnoses and all orders for this visit:    Well adult exam            Subjective:        Patient ID: Shelia Cooper is a 50 y o  female  Patient is here for wellness exam   Labs and vaccines reviewed  Patient up-to-date with gynecologic care  Patient today with mammograms  Patient has had colonoscopy  The following portions of the patient's history were reviewed and updated as appropriate: allergies, current medications, past family history, past medical history, past social history, past surgical history and problem list       Review of Systems   Constitutional: Negative  HENT: Negative  Eyes: Negative  Respiratory: Negative  Cardiovascular: Negative  Gastrointestinal: Negative  Endocrine: Negative  Genitourinary: Negative  Musculoskeletal: Negative  Skin: Negative  Allergic/Immunologic: Negative  Neurological: Negative  Hematological: Negative  Psychiatric/Behavioral: Negative  Objective:        Depression Screening and Follow-up Plan: Patient was screened for depression during today's encounter  They screened negative with a PHQ-2 score of 0             /70 (BP Location: Right arm, Patient Position: Sitting, Cuff Size: Standard)   Pulse 95   Temp 98 5 °F (36 9 °C) (Temporal)   Ht 5' 4" (1 626 m)   Wt 65 1 kg (143 lb 9 6 oz)   SpO2 96%   BMI 24 65 kg/m²          Physical Exam  Vitals and nursing note reviewed  Constitutional:       General: She is not in acute distress  Appearance: Normal appearance  She is not ill-appearing, toxic-appearing or diaphoretic  HENT:      Head: Normocephalic and atraumatic        Right Ear: Tympanic membrane, ear canal and external ear normal  There is no impacted cerumen  Left Ear: Tympanic membrane, ear canal and external ear normal  There is no impacted cerumen  Nose: Nose normal  No congestion or rhinorrhea  Mouth/Throat:      Mouth: Mucous membranes are moist       Pharynx: No oropharyngeal exudate or posterior oropharyngeal erythema  Eyes:      General: No scleral icterus  Right eye: No discharge  Left eye: No discharge  Extraocular Movements: Extraocular movements intact  Conjunctiva/sclera: Conjunctivae normal       Pupils: Pupils are equal, round, and reactive to light  Neck:      Vascular: No carotid bruit  Cardiovascular:      Rate and Rhythm: Normal rate and regular rhythm  Pulses: Normal pulses  Heart sounds: Normal heart sounds  No murmur heard  No friction rub  No gallop  Pulmonary:      Effort: Pulmonary effort is normal  No respiratory distress  Breath sounds: Normal breath sounds  No stridor  No wheezing, rhonchi or rales  Chest:      Chest wall: No tenderness  Abdominal:      General: Abdomen is flat  Bowel sounds are normal  There is no distension  Palpations: Abdomen is soft  Tenderness: There is no abdominal tenderness  There is no guarding or rebound  Musculoskeletal:         General: No swelling, tenderness, deformity or signs of injury  Normal range of motion  Cervical back: Normal range of motion and neck supple  No rigidity  No muscular tenderness  Right lower leg: No edema  Left lower leg: No edema  Lymphadenopathy:      Cervical: No cervical adenopathy  Skin:     General: Skin is warm and dry  Capillary Refill: Capillary refill takes less than 2 seconds  Coloration: Skin is not jaundiced  Findings: No bruising, erythema, lesion or rash  Neurological:      Mental Status: She is alert and oriented to person, place, and time  Mental status is at baseline  Cranial Nerves: No cranial nerve deficit        Sensory: No sensory deficit  Motor: No weakness  Coordination: Coordination normal       Gait: Gait normal    Psychiatric:         Mood and Affect: Mood normal          Behavior: Behavior normal          Thought Content:  Thought content normal          Judgment: Judgment normal

## 2023-03-27 ENCOUNTER — OFFICE VISIT (OUTPATIENT)
Dept: FAMILY MEDICINE CLINIC | Facility: CLINIC | Age: 53
End: 2023-03-27

## 2023-03-27 VITALS
HEIGHT: 64 IN | DIASTOLIC BLOOD PRESSURE: 82 MMHG | OXYGEN SATURATION: 99 % | HEART RATE: 90 BPM | WEIGHT: 147.8 LBS | TEMPERATURE: 98.6 F | BODY MASS INDEX: 25.23 KG/M2 | SYSTOLIC BLOOD PRESSURE: 128 MMHG

## 2023-03-27 DIAGNOSIS — H61.22 IMPACTED CERUMEN OF LEFT EAR: Primary | ICD-10-CM

## 2023-03-27 NOTE — PROGRESS NOTES
"Assessment/Plan: Cerumen patient removed with irrigation and cerumen       Diagnoses and all orders for this visit:    Impacted cerumen of left ear            Subjective:        Patient ID: Ochoa Villanueva is a 50 y o  female  Patient is here for possible cerumen impaction on the left  Patient with decreased hearing  No fever cold symptoms  The following portions of the patient's history were reviewed and updated as appropriate: allergies, current medications, past family history, past medical history, past social history, past surgical history and problem list       Review of Systems   Constitutional: Negative  HENT: Positive for hearing loss  Eyes: Negative  Respiratory: Negative  Cardiovascular: Negative  Gastrointestinal: Negative  Endocrine: Negative  Genitourinary: Negative  Musculoskeletal: Negative  Skin: Negative  Allergic/Immunologic: Negative  Neurological: Negative  Hematological: Negative  Psychiatric/Behavioral: Negative  Objective:        Depression Screening and Follow-up Plan: Patient was screened for depression during today's encounter  They screened negative with a PHQ-2 score of 0             /82 (BP Location: Left arm, Patient Position: Sitting, Cuff Size: Standard)   Pulse 90   Temp 98 6 °F (37 °C) (Temporal)   Ht 5' 4\" (1 626 m)   Wt 67 kg (147 lb 12 8 oz)   LMP 03/13/2023 (Exact Date)   SpO2 99%   BMI 25 37 kg/m²          Physical Exam  Vitals and nursing note reviewed  Constitutional:       Appearance: Normal appearance  HENT:      Left Ear: There is impacted cerumen  Neurological:      Mental Status: She is alert           "

## 2023-05-20 PROBLEM — Z00.00 WELL ADULT EXAM: Status: RESOLVED | Noted: 2023-03-21 | Resolved: 2023-05-20

## 2023-05-26 PROBLEM — H61.22 IMPACTED CERUMEN OF LEFT EAR: Status: RESOLVED | Noted: 2023-03-27 | Resolved: 2023-05-26

## 2023-06-13 ENCOUNTER — CLINICAL SUPPORT (OUTPATIENT)
Dept: FAMILY MEDICINE CLINIC | Facility: CLINIC | Age: 53
End: 2023-06-13
Payer: COMMERCIAL

## 2023-06-13 DIAGNOSIS — Z23 ENCOUNTER FOR IMMUNIZATION: Primary | ICD-10-CM

## 2023-06-13 PROCEDURE — 90750 HZV VACC RECOMBINANT IM: CPT

## 2023-06-13 PROCEDURE — 90471 IMMUNIZATION ADMIN: CPT

## 2023-06-15 ENCOUNTER — TELEPHONE (OUTPATIENT)
Dept: FAMILY MEDICINE CLINIC | Facility: CLINIC | Age: 53
End: 2023-06-15

## 2023-06-15 DIAGNOSIS — L29.9 ITCHING: Primary | ICD-10-CM

## 2023-06-15 RX ORDER — METHYLPREDNISOLONE 4 MG/1
TABLET ORAL
Qty: 21 EACH | Refills: 0 | Status: SHIPPED | OUTPATIENT
Start: 2023-06-15

## 2023-06-15 NOTE — TELEPHONE ENCOUNTER
Patient got shingrix vaccine on 6/13/23 and states she started itching all over her body last night, took benedryl but didn't improve much, wondering if this is side effect and is there something else she can use?

## 2023-06-15 NOTE — TELEPHONE ENCOUNTER
Contacted patient and advised her to continue with benadryl and he is also going to send in a medrol dose pack and if she is not feeling any better she should let us know

## 2024-01-08 ENCOUNTER — AMB VIDEO VISIT (OUTPATIENT)
Dept: OTHER | Facility: HOSPITAL | Age: 54
End: 2024-01-08

## 2024-01-08 VITALS — TEMPERATURE: 97.9 F

## 2024-01-08 DIAGNOSIS — J01.90 ACUTE SINUSITIS, RECURRENCE NOT SPECIFIED, UNSPECIFIED LOCATION: Primary | ICD-10-CM

## 2024-01-08 PROCEDURE — ECARE PR SL URGENT CARE VIRTUAL VISIT: Performed by: NURSE PRACTITIONER

## 2024-01-08 RX ORDER — AZITHROMYCIN 250 MG/1
TABLET, FILM COATED ORAL
Qty: 6 TABLET | Refills: 0 | Status: SHIPPED | OUTPATIENT
Start: 2024-01-08 | End: 2024-01-12

## 2024-01-08 NOTE — CARE ANYWHERE EVISITS
Visit Summary for DAVY STARK - Gender: Female - Date of Birth: 1970  Date: [id number] - Duration: 3 minutes  Patient: DAVY STARK  Provider: Rachna MERAZ    Patient Contact Information  Address  [address] [place] [address] [address]; [place] [address]  [phone]    Visit Topics  Earache [Added By: Self - 2024-01-08]  Cold [Added By: Self - 2024-01-08]    Triage Questions   What is your current physical address in the event of a medical emergency? Answer []  Are you allergic to any medications? Answer []  Are you now or could you be pregnant? Answer []  Do you have any immune system compromise or chronic lung   disease? Answer []  Do you have any vulnerable family members in the home (infant, pregnant, cancer, elderly)? Answer []     Conversation Transcripts  [0A][0A] [Notification] You are connected with Rachna MERAZ, Urgent Care Specialist.[0A][Notification] DAVY STARK is located in [place].[0A][Notification] DAVY STARK has shared health history...[0A]    Diagnosis  Acute pansinusitis    Procedures  Value: 22106 Code: CPT-4 UNLISTED E&M SERVICE    Medications Prescribed    No prescriptions ordered    Electronically signed by: Rachna Horvath(NPI [id number])

## 2024-01-08 NOTE — PROGRESS NOTES
Required Documentation:  Encounter provider MARIYA Osorio    Provider located at [facility]  VIRTUAL CARE   [address] [facility] [id number]    Identify all parties in room with patient during virtual visit:  No one else    The patient was identified by name and date of birth. Thao Ahumada was informed that this is a telemedicine visit and that the visit is being conducted through the [employer] platform. She agrees to proceed..  My office door was closed. No one else was in the room.  She acknowledged consent and understanding of privacy and security of the video platform. The patient has agreed to participate and understands they can discontinue the visit at any time.    Verification of patient location:    Patient is located at home in the following state in which I hold an active license [place]    Patient is aware this is a billable service.     Reason for visit is No chief complaint on file.       Subjective  This is a 53 year old female her today for video visit.  She had cold like symptoms for 1 week.  Cough and congestion.  She is having increased sinus pressure.  She is having pain in her left ear.  She denies any chest pain or sob.  He is having clear to yellow discharge.  She has tried OTC mucinex D and delsym night time.  Covid test was negative.  She did not have covid booster.             Past Medical History:   Diagnosis Date    Thoracic myofascial strain 2018       Past Surgical History:   Procedure Laterality Date     SECTION      COLONOSCOPY          Allergies   Allergen Reactions    Penicillins Hives    Sulfa Antibiotics Hives    Sulfasalazine        Review of Systems   Constitutional:  Negative for activity change, chills, fatigue and fever.   HENT:  Positive for congestion, postnasal drip and rhinorrhea.    Cardiovascular: Negative.    Gastrointestinal: Negative.    Skin: Negative.    Neurological: Negative.     Psychiatric/Behavioral: Negative.         Video Exam    Vitals:    01/08/24 1002   Temp: 97.9 °F (36.6 °C)       Physical Exam  Constitutional:       General: She is not in acute distress.     Appearance: Normal appearance. She is not ill-appearing or toxic-appearing.   HENT:      Head: Normocephalic and atraumatic.      Nose: Congestion and rhinorrhea present.   Pulmonary:      Effort: Pulmonary effort is normal. No respiratory distress.   Neurological:      Mental Status: She is alert and oriented to person, place, and time.   Psychiatric:         Mood and Affect: Mood normal.         Behavior: Behavior normal.         Thought Content: Thought content normal.         Judgment: Judgment normal.         Visit Time  Total Visit Duration: 6 minutes    Assessment/Plan:    Diagnoses and all orders for this visit:    Acute sinusitis, recurrence not specified, unspecified location  -     azithromycin (ZITHROMAX) 250 mg tablet; Take 2 tablets today then 1 tablet daily x 4 days        Patient Instructions   Rest and drink extra fluids.  Start antibiotic.  Take probiotic.  OTC cough and cold medication.  Tylenol as needed.  Follow up with PCP if no improvement.  Go to ER with any worsening symptoms.     Rhinosinusitis   WHAT YOU NEED TO KNOW:   Rhinosinusitis (RS) is inflammation or infection of your nasal passages and sinuses. RS is most often caused by a virus but may be caused by bacteria. Acute RS lasts up to 12 weeks. Chronic RS lasts more than 12 weeks. Recurrent RS means you have 4 or more infections in 1 year.       DISCHARGE INSTRUCTIONS:   Return to the emergency department if:   You have trouble breathing, or wheezing that is getting worse.    You have a stiff neck, a fever, or a bad headache.    You cannot open your eye.    Your eyeball bulges out, or you cannot move your eye.    You are more sleepy than usual, or you notice changes in your ability to think, move, or talk.    You have swelling of your forehead  or scalp.    Call your doctor if:   You have vision changes, such as double vision.    Your eye and eyelid are red, swollen, and painful.    Your symptoms do not improve after 10 days.    You have nausea and are vomiting.    Your nose is bleeding.    You have questions or concerns about your condition or care.    Medicines:  Your symptoms may go away on their own. Your healthcare provider may recommend watchful waiting for up to 10 days before starting antibiotics. Antibiotics will not help if the infection is caused by a virus. Check with your provider before you take any over-the-counter medicines. You may need any of the following:  Acetaminophen  decreases pain and fever. It is available without a doctor's order. Ask how much to take and how often to take it. Follow directions. Read the labels of all other medicines you are using to see if they also contain acetaminophen, or ask your doctor or pharmacist. Acetaminophen can cause liver damage if not taken correctly.    NSAIDs , such as ibuprofen, help decrease swelling, pain, and fever. This medicine is available with or without a doctor's order. NSAIDs can cause stomach bleeding or kidney problems in certain people. If you take blood thinner medicine, always ask your healthcare provider if NSAIDs are safe for you. Always read the medicine label and follow directions.    Nasal steroid sprays  help decrease inflammation in your nose and sinuses.    Decongestants  help reduce swelling and drain mucus in the nose and sinuses. They may help you breathe easier. Do not use decongestants for more than 3 days.    Antihistamines  help dry mucus in the nose and relieve sneezing.    Antibiotics  help treat or prevent a bacterial infection.    Self-care:   Rinse your sinuses as directed.  Use a sinus rinse device to rinse your nasal passages with a saline (salt water) solution or distilled water. Do not  use tap water. A sinus rinse will help thin the mucus in your nose and  rinse away pollen and dirt. It will also help lower swelling so you can breathe normally.    Use a humidifier  to increase air moisture in your home. Moist air may make it easier for you to breathe and help decrease your cough.    Sleep with your head raised.  Place an extra pillow under your head before you go to sleep to help your sinuses drain.    Drink liquids as directed.  Ask your healthcare provider how much liquid to drink each day and which liquids are best for you. Liquids will thin the mucus in your nose and help it drain. Do not have drinks that contain alcohol or caffeine.    Do not smoke, and avoid secondhand smoke.  Nicotine and other chemicals in cigarettes and cigars can make your symptoms worse. Ask your healthcare provider for information if you currently smoke and need help to quit. E-cigarettes or smokeless tobacco still contain nicotine. Talk to your healthcare provider before you use these products.    Prevent the spread of germs:   Wash your hands often with soap and water.  Wash your hands after you use the bathroom, change a child's diaper, or sneeze. Wash your hands before you prepare or eat food.         Stay away from people who are sick.  Some germs spread easily and quickly through contact.    Follow up with your doctor as directed:  You may be referred to an ear, nose, and throat specialist. Write down your questions so you remember to ask them during your visits.  © Copyright [employer] 2023 Information is for End User's use only and may not be sold, redistributed or otherwise used for commercial purposes.  The above information is an  only. It is not intended as medical advice for individual conditions or treatments. Talk to your doctor, nurse or pharmacist before following any medical regimen to see if it is safe and effective for you.

## 2024-01-08 NOTE — PATIENT INSTRUCTIONS
Rest and drink extra fluids.  Start antibiotic.  Take probiotic.  OTC cough and cold medication.  Tylenol as needed.  Follow up with PCP if no improvement.  Go to ER with any worsening symptoms.     Rhinosinusitis   WHAT YOU NEED TO KNOW:   Rhinosinusitis (RS) is inflammation or infection of your nasal passages and sinuses. RS is most often caused by a virus but may be caused by bacteria. Acute RS lasts up to 12 weeks. Chronic RS lasts more than 12 weeks. Recurrent RS means you have 4 or more infections in 1 year.       DISCHARGE INSTRUCTIONS:   Return to the emergency department if:   You have trouble breathing, or wheezing that is getting worse.    You have a stiff neck, a fever, or a bad headache.    You cannot open your eye.    Your eyeball bulges out, or you cannot move your eye.    You are more sleepy than usual, or you notice changes in your ability to think, move, or talk.    You have swelling of your forehead or scalp.    Call your doctor if:   You have vision changes, such as double vision.    Your eye and eyelid are red, swollen, and painful.    Your symptoms do not improve after 10 days.    You have nausea and are vomiting.    Your nose is bleeding.    You have questions or concerns about your condition or care.    Medicines:  Your symptoms may go away on their own. Your healthcare provider may recommend watchful waiting for up to 10 days before starting antibiotics. Antibiotics will not help if the infection is caused by a virus. Check with your provider before you take any over-the-counter medicines. You may need any of the following:  Acetaminophen  decreases pain and fever. It is available without a doctor's order. Ask how much to take and how often to take it. Follow directions. Read the labels of all other medicines you are using to see if they also contain acetaminophen, or ask your doctor or pharmacist. Acetaminophen can cause liver damage if not taken correctly.    NSAIDs , such as ibuprofen,  help decrease swelling, pain, and fever. This medicine is available with or without a doctor's order. NSAIDs can cause stomach bleeding or kidney problems in certain people. If you take blood thinner medicine, always ask your healthcare provider if NSAIDs are safe for you. Always read the medicine label and follow directions.    Nasal steroid sprays  help decrease inflammation in your nose and sinuses.    Decongestants  help reduce swelling and drain mucus in the nose and sinuses. They may help you breathe easier. Do not use decongestants for more than 3 days.    Antihistamines  help dry mucus in the nose and relieve sneezing.    Antibiotics  help treat or prevent a bacterial infection.    Self-care:   Rinse your sinuses as directed.  Use a sinus rinse device to rinse your nasal passages with a saline (salt water) solution or distilled water. Do not  use tap water. A sinus rinse will help thin the mucus in your nose and rinse away pollen and dirt. It will also help lower swelling so you can breathe normally.    Use a humidifier  to increase air moisture in your home. Moist air may make it easier for you to breathe and help decrease your cough.    Sleep with your head raised.  Place an extra pillow under your head before you go to sleep to help your sinuses drain.    Drink liquids as directed.  Ask your healthcare provider how much liquid to drink each day and which liquids are best for you. Liquids will thin the mucus in your nose and help it drain. Do not have drinks that contain alcohol or caffeine.    Do not smoke, and avoid secondhand smoke.  Nicotine and other chemicals in cigarettes and cigars can make your symptoms worse. Ask your healthcare provider for information if you currently smoke and need help to quit. E-cigarettes or smokeless tobacco still contain nicotine. Talk to your healthcare provider before you use these products.    Prevent the spread of germs:   Wash your hands often with soap and water.   Wash your hands after you use the bathroom, change a child's diaper, or sneeze. Wash your hands before you prepare or eat food.         Stay away from people who are sick.  Some germs spread easily and quickly through contact.    Follow up with your doctor as directed:  You may be referred to an ear, nose, and throat specialist. Write down your questions so you remember to ask them during your visits.  © Copyright [employer] 2023 Information is for End User's use only and may not be sold, redistributed or otherwise used for commercial purposes.  The above information is an  only. It is not intended as medical advice for individual conditions or treatments. Talk to your doctor, nurse or pharmacist before following any medical regimen to see if it is safe and effective for you.

## 2024-06-19 ENCOUNTER — TELEPHONE (OUTPATIENT)
Age: 54
End: 2024-06-19

## 2024-06-19 ENCOUNTER — OFFICE VISIT (OUTPATIENT)
Dept: FAMILY MEDICINE CLINIC | Facility: CLINIC | Age: 54
End: 2024-06-19
Payer: COMMERCIAL

## 2024-06-19 VITALS
BODY MASS INDEX: 24.07 KG/M2 | WEIGHT: 141 LBS | HEIGHT: 64 IN | SYSTOLIC BLOOD PRESSURE: 112 MMHG | HEART RATE: 78 BPM | OXYGEN SATURATION: 98 % | TEMPERATURE: 98.2 F | DIASTOLIC BLOOD PRESSURE: 64 MMHG

## 2024-06-19 DIAGNOSIS — Z83.2 FAMILY HISTORY OF CLOTTING DISORDER: ICD-10-CM

## 2024-06-19 DIAGNOSIS — F51.01 PRIMARY INSOMNIA: ICD-10-CM

## 2024-06-19 DIAGNOSIS — Z13.9 SCREENING DUE: ICD-10-CM

## 2024-06-19 DIAGNOSIS — Z00.00 WELL ADULT EXAM: Primary | ICD-10-CM

## 2024-06-19 DIAGNOSIS — Z23 ENCOUNTER FOR IMMUNIZATION: ICD-10-CM

## 2024-06-19 PROCEDURE — 99396 PREV VISIT EST AGE 40-64: CPT | Performed by: FAMILY MEDICINE

## 2024-06-19 PROCEDURE — 90471 IMMUNIZATION ADMIN: CPT

## 2024-06-19 PROCEDURE — 90715 TDAP VACCINE 7 YRS/> IM: CPT

## 2024-06-19 RX ORDER — ZOLPIDEM TARTRATE 6.25 MG/1
6.25 TABLET, FILM COATED, EXTENDED RELEASE ORAL
Qty: 30 TABLET | Refills: 0 | Status: SHIPPED | OUTPATIENT
Start: 2024-06-19

## 2024-06-19 NOTE — PROGRESS NOTES
Assessment/Plan: Wellness exam done.  Colonoscopy up-to-date.  Patient up-to-date with mammogram as well as gynecologic care.  Vaccines reviewed.  Patient will have Adacel shot at this time.  Laboratory studies reviewed.  Patient will have laboratory studies done at this time.  Good sleep hygiene recommended.       Diagnoses and all orders for this visit:    Well adult exam  -     Comprehensive metabolic panel; Future  -     CBC and differential; Future  -     Lipid panel; Future  -     TSH, 3rd generation with Free T4 reflex; Future    Screening due  -     Ambulatory Referral to Obstetrics / Gynecology; Future  -     Mammo screening bilateral w cad; Future    Family history of clotting disorder  -     FACTOR V (LEIDEN) MUTATION ANALYSIS; Future    Primary insomnia  -     zolpidem (AMBIEN CR) 6.25 MG CR tablet; Take 1 tablet (6.25 mg total) by mouth daily at bedtime as needed for sleep            Subjective:        Patient ID: Thao Ahumada is a 54 y.o. female.      Patient is here for wellness exam.  Labs and vaccines reviewed.  Patient up-to-date with gynecologic care as well as mammograms.  Colonoscopy up-to-date.  Patient doing well overall.  Patient with some insomnia.  Patient has difficulty staying asleep.  Patient does go to sleep okay but then wakes up.  Patient has used Tylenol PM.  Family history of factor V [place].  No personal history of blood clots.          The following portions of the patient's history were reviewed and updated as appropriate: allergies, current medications, past family history, past medical history, past social history, past surgical history and problem list.      Review of Systems   Constitutional: Negative.    HENT: Negative.     Eyes: Negative.    Respiratory: Negative.     Cardiovascular: Negative.    Gastrointestinal: Negative.    Endocrine: Negative.    Genitourinary: Negative.    Musculoskeletal: Negative.    Skin: Negative.    Allergic/Immunologic: Negative.   "  Neurological: Negative.    Hematological: Negative.    Psychiatric/Behavioral: Negative.             Objective:        Depression Screening and Follow-up Plan: Patient was screened for depression during today's encounter. They screened negative with a PHQ-2 score of 0.            /64 (BP Location: Right arm, Patient Position: Sitting, Cuff Size: Standard)   Pulse 78   Temp 98.2 °F (36.8 °C) (Temporal)   Ht 5' 4\" (1.626 m)   Wt 64 kg (141 lb)   SpO2 98%   BMI 24.20 kg/m²          Physical Exam  Vitals and nursing note reviewed.   Constitutional:       General: She is not in acute distress.     Appearance: Normal appearance. She is not ill-appearing, toxic-appearing or diaphoretic.   HENT:      Head: Normocephalic and atraumatic.      Right Ear: Tympanic membrane, ear canal and external ear normal. There is no impacted cerumen.      Left Ear: Tympanic membrane, ear canal and external ear normal. There is no impacted cerumen.      Nose: Nose normal. No congestion or rhinorrhea.      Mouth/Throat:      Mouth: Mucous membranes are moist.      Pharynx: No oropharyngeal exudate or posterior oropharyngeal erythema.   Eyes:      General: No scleral icterus.        Right eye: No discharge.         Left eye: No discharge.   Neck:      Vascular: No carotid bruit.   Cardiovascular:      Rate and Rhythm: Normal rate and regular rhythm.      Pulses: Normal pulses.      Heart sounds: Normal heart sounds. No murmur heard.     No friction rub. No gallop.   Pulmonary:      Effort: Pulmonary effort is normal. No respiratory distress.      Breath sounds: Normal breath sounds. No stridor. No wheezing, rhonchi or rales.   Chest:      Chest wall: No tenderness.   Musculoskeletal:         General: No swelling, tenderness, deformity or signs of injury. Normal range of motion.      Cervical back: Normal range of motion and neck supple. No rigidity. No muscular tenderness.      Right lower leg: No edema.      Left lower leg: No " edema.   Lymphadenopathy:      Cervical: No cervical adenopathy.   Skin:     General: Skin is warm and dry.      Capillary Refill: Capillary refill takes less than 2 seconds.      Coloration: Skin is not jaundiced.      Findings: No bruising, erythema, lesion or rash.   Neurological:      Mental Status: She is alert and oriented to person, place, and time. Mental status is at baseline.      Cranial Nerves: No cranial nerve deficit.      Sensory: No sensory deficit.      Motor: No weakness.      Coordination: Coordination normal.      Gait: Gait normal.   Psychiatric:         Mood and Affect: Mood normal.         Behavior: Behavior normal.         Thought Content: Thought content normal.         Judgment: Judgment normal.

## 2024-07-19 PROBLEM — Z00.00 WELL ADULT EXAM: Status: RESOLVED | Noted: 2024-06-19 | Resolved: 2024-07-19

## 2024-10-08 ENCOUNTER — VBI (OUTPATIENT)
Dept: ADMINISTRATIVE | Facility: OTHER | Age: 54
End: 2024-10-08

## 2024-10-08 NOTE — TELEPHONE ENCOUNTER
10/08/24 3:47 PM     Chart reviewed for CRC: Colonoscopy was/were not submitted to the patient's insurance.     Orquidea Juárez MA   [facility]

## 2025-04-10 ENCOUNTER — TELEMEDICINE (OUTPATIENT)
Dept: OTHER | Facility: HOSPITAL | Age: 55
End: 2025-04-10
Payer: COMMERCIAL

## 2025-04-10 DIAGNOSIS — H10.022 PINK EYE DISEASE OF LEFT EYE: Primary | ICD-10-CM

## 2025-04-10 PROCEDURE — 99213 OFFICE O/P EST LOW 20 MIN: CPT | Performed by: NURSE PRACTITIONER

## 2025-04-10 RX ORDER — TOBRAMYCIN 3 MG/ML
1 SOLUTION/ DROPS OPHTHALMIC
Qty: 5 ML | Refills: 0 | Status: SHIPPED | OUTPATIENT
Start: 2025-04-10 | End: 2025-04-17

## 2025-04-10 NOTE — PROGRESS NOTES
Virtual Regular Visit  Name: Thao Ahumada      : 1970      MRN: [id number]  Encounter Provider: MARIYA Oosrio  Encounter Date: 4/10/2025   Encounter department: VIRTUAL CARE       Verification of patient location:  Patient is located at Home in the following state in which I hold an active license [place] :  Assessment & Plan  Pink eye disease of left eye    Orders:    tobramycin (TOBREX) 0.3 % SOLN; Administer 1 drop to the right eye every 4 (four) hours while awake for 7 days        Encounter provider MARIYA Osorio    The patient was identified by name and date of birth. Thao Ahumada was informed that this is a telemedicine visit and that the visit is being conducted through the Epic Embedded platform. She agrees to proceed..  My office door was closed. No one else was in the room. She acknowledged consent and understanding of privacy and security of the video platform. The patient has agreed to participate and understands they can discontinue the visit at any time.    Patient is aware this is a billable service.     History obtained from: patient  History of Present Illness     This is a 54 year old female here today for video visit.  She states she woke up today with red and discharge.  She states it was crusted when she woke up.  No pain but but burning.  No change in vision.   She has not had any cough or congestion.    She does wear contacts.       Review of Systems   Constitutional:  Negative for activity change, chills, diaphoresis and fatigue.   Eyes:  Positive for discharge and redness. Negative for pain.   Respiratory: Negative.     Neurological: Negative.    Psychiatric/Behavioral: Negative.         Objective   There were no vitals taken for this visit.    Physical Exam  Constitutional:       General: She is not in acute distress.     Appearance: Normal appearance. She is not ill-appearing or toxic-appearing.   HENT:      Head: Normocephalic and atraumatic.   Eyes:       Comments: Left eye injected with some tearing   Pulmonary:      Effort: Pulmonary effort is normal. No respiratory distress.   Neurological:      Mental Status: She is alert and oriented to person, place, and time.   Psychiatric:         Mood and Affect: Mood normal.         Behavior: Behavior normal.         Thought Content: Thought content normal.         Judgment: Judgment normal.         Visit Time  Total Visit Duration: 4 minutes not including the time spent for establishing the audio/video connection.

## 2025-04-10 NOTE — PATIENT INSTRUCTIONS
Warm compresses 3 times per day.  Start eye drops.  Avoid rubbing eye.  Change pillow case.  Use a clean cloth each time. Follow up with eye doctor.  Go to ER with any worsening symptoms.     Conjunctivitis   WHAT YOU NEED TO KNOW:   Conjunctivitis, or pink eye, is inflammation of your conjunctiva. The conjunctiva is a thin tissue that covers the front of your eye and the back of your eyelid. The conjunctiva helps protect your eye and keep it moist. The most common cause of conjunctivitis is infection with bacteria or a virus. Allergies or exposure to a chemical may also cause conjunctivitis. Conjunctivitis is easily spread from person to person.       DISCHARGE INSTRUCTIONS:   Return to the emergency department if:   You have worsening eye pain.    The swelling in your eye gets worse, even after treatment.    Your vision suddenly becomes worse, or you cannot see at all.    Call your doctor if:   Your start to notice changes in your vision.    You develop a fever and ear pain.    You have tiny bumps or spots of blood on your eye.    You have questions or concerns about your condition or care.    Medicines:  You may need any of the following:  Allergy medicine  helps decrease itchy, red, swollen eyes caused by allergies. It may be given as a pill, eye drops, or nasal spray.    Antibiotics  may be needed if your conjunctivitis is caused by bacteria. This medicine may be given as a pill, eye drops, or eye ointment.    Take your medicine as directed.  Contact your healthcare provider if you think your medicine is not helping or if you have side effects. Tell your provider if you are allergic to any medicine. Keep a list of the medicines, vitamins, and herbs you take. Include the amounts, and when and why you take them. Bring the list or the pill bottles to follow-up visits. Carry your medicine list with you in case of an emergency.    Manage your symptoms:   Apply a cool compress.  Wet a washcloth with cold water and  place it on your eye. This will help decrease itching and irritation.    Use artificial tears.  This will help lessen your symptoms, including itching or irritation.    Do not wear contact lenses  until treatment is complete and your symptoms are gone.    Flush your eye.  You may need to flush your eye with saline to help decrease your symptoms. Ask for more information on how to flush your eye.    Prevent the spread of conjunctivitis:   Wash your hands with soap and water often.  Wash your hands before and after you touch your eyes. Wash your hands after you use the bathroom, change a child's diaper, or sneeze. Wash your hands before you prepare or eat food.         Avoid contact with others.  Do not share towels or washcloths. Try to stay away from others as much as possible. Ask when you can return to work or school.    Avoid allergens and irritants.  Try to avoid the things that cause your allergies, such as pets, dust, or grass. Stay away from smoke filled areas. Shield your eyes from wind and sun.    Throw away eye makeup.  Bacteria can stay in eye makeup. Throw away your current mascara and other eye makeup. Never share mascara or other eye makeup with anyone.    Follow up with your doctor as directed:  You may be referred to an ophthalmologist for treatment. Write down your questions so you remember to ask them during your visits.  © Copyright [employer] 2023 Information is for End User's use only and may not be sold, redistributed or otherwise used for commercial purposes.  The above information is an  only. It is not intended as medical advice for individual conditions or treatments. Talk to your doctor, nurse or pharmacist before following any medical regimen to see if it is safe and effective for you.

## 2025-07-07 ENCOUNTER — OFFICE VISIT (OUTPATIENT)
Age: 55
End: 2025-07-07
Payer: COMMERCIAL

## 2025-07-07 VITALS
TEMPERATURE: 98.2 F | HEIGHT: 64 IN | WEIGHT: 146.8 LBS | BODY MASS INDEX: 25.06 KG/M2 | DIASTOLIC BLOOD PRESSURE: 82 MMHG | HEART RATE: 68 BPM | OXYGEN SATURATION: 99 % | SYSTOLIC BLOOD PRESSURE: 126 MMHG

## 2025-07-07 DIAGNOSIS — G43.009 MIGRAINE WITHOUT AURA AND WITHOUT STATUS MIGRAINOSUS, NOT INTRACTABLE: Primary | ICD-10-CM

## 2025-07-07 PROCEDURE — 99213 OFFICE O/P EST LOW 20 MIN: CPT | Performed by: FAMILY MEDICINE

## 2025-07-07 RX ORDER — IBUPROFEN 200 MG
TABLET ORAL EVERY 6 HOURS PRN
COMMUNITY

## 2025-07-07 NOTE — PROGRESS NOTES
"Name: Thao Ahumada      : 1970      MRN: [id number]  Encounter Provider: Talon Lua DO  Encounter Date: 2025   Encounter department: [facility] PRIMARY CARE  :  Assessment & Plan  Migraine without aura and without status migrainosus, not intractable      Orders:    CBC and differential; Future    Comprehensive metabolic panel; Future    Lipid panel; Future    TSH, 3rd generation with Free T4 reflex; Future    To consider MRI.  Patient will have laboratory studies.  Samples of Ubrelvy given x 3.  Patient will call office if not seeing improvement.       History of Present Illness   Patient is here to follow-up on migraines.  Patient with significant migraines intermittently since Thursday night.  No aura noted.  Patient with headache presently.  Patient's pain 4-10.  Patient is use Excedrin Migraine as well as ibuprofen.  Patient does get menstrual migraines.  No other new neurologic issues associated with this headache.    Migraine      Review of Systems   Constitutional: Negative.    HENT: Negative.     Eyes: Negative.    Respiratory: Negative.     Cardiovascular: Negative.    Gastrointestinal: Negative.    Endocrine: Negative.    Genitourinary: Negative.    Musculoskeletal: Negative.    Skin: Negative.    Allergic/Immunologic: Negative.    Neurological:  Positive for headaches.   Hematological: Negative.    Psychiatric/Behavioral: Negative.         Objective   /82 (BP Location: Left arm, Patient Position: Sitting, Cuff Size: Standard)   Pulse 68   Temp 98.2 °F (36.8 °C) (Temporal)   Ht 5' 4\" (1.626 m)   Wt 66.6 kg (146 lb 12.8 oz)   SpO2 99%   BMI 25.20 kg/m²      Physical Exam  Vitals and nursing note reviewed.   Constitutional:       General: She is not in acute distress.     Appearance: She is well-developed. She is ill-appearing. She is not toxic-appearing or diaphoretic.   HENT:      Head: Normocephalic and atraumatic.      Right Ear: Tympanic membrane, ear canal and " external ear normal.      Left Ear: Tympanic membrane, ear canal and external ear normal.      Nose: Nose normal.      Mouth/Throat:      Mouth: Mucous membranes are moist.      Pharynx: No oropharyngeal exudate or posterior oropharyngeal erythema.     Eyes:      General:         Right eye: No discharge.         Left eye: No discharge.     Neck:      Thyroid: No thyromegaly.      Vascular: No carotid bruit.      Trachea: No tracheal deviation.     Cardiovascular:      Rate and Rhythm: Normal rate and regular rhythm.      Heart sounds: Normal heart sounds. No murmur heard.     No gallop.   Pulmonary:      Effort: Pulmonary effort is normal. No respiratory distress.      Breath sounds: Normal breath sounds. No stridor. No wheezing or rales.   Chest:      Chest wall: No tenderness.   Abdominal:      General: Bowel sounds are normal.     Musculoskeletal:         General: No tenderness or deformity. Normal range of motion.      Cervical back: Normal range of motion and neck supple.   Lymphadenopathy:      Cervical: No cervical adenopathy.     Skin:     General: Skin is warm and dry.      Coloration: Skin is not pale.      Findings: No erythema or rash.     Neurological:      Mental Status: She is alert and oriented to person, place, and time. Mental status is at baseline.      Motor: No abnormal muscle tone.      Gait: Gait normal.     Psychiatric:         Mood and Affect: Mood normal.         Behavior: Behavior normal.         Thought Content: Thought content normal.         Judgment: Judgment normal.

## 2025-07-07 NOTE — ASSESSMENT & PLAN NOTE
{If prescribing CGRP gepants (Nurtec, Ubrelvy, Qulipta) or monoclonal antibodies (Emagality, Ajovy, Aimovig) that currently do not have a prior auth on file, click here to fill out prior auth smartform and then hit F2 with this smartlist to insert prior auth documentation (Optional):[id number]}    Orders:    CBC and differential; Future    Comprehensive metabolic panel; Future    Lipid panel; Future    TSH, 3rd generation with Free T4 reflex; Future

## 2025-07-08 ENCOUNTER — APPOINTMENT (OUTPATIENT)
Dept: LAB | Facility: CLINIC | Age: 55
End: 2025-07-08
Payer: COMMERCIAL

## 2025-07-08 DIAGNOSIS — G43.009 MIGRAINE WITHOUT AURA AND WITHOUT STATUS MIGRAINOSUS, NOT INTRACTABLE: ICD-10-CM

## 2025-07-08 LAB
ALBUMIN SERPL BCG-MCNC: 4.6 G/DL (ref 3.5–5)
ALP SERPL-CCNC: 59 U/L (ref 34–104)
ALT SERPL W P-5'-P-CCNC: 24 U/L (ref 7–52)
ANION GAP SERPL CALCULATED.3IONS-SCNC: 7 MMOL/L (ref 4–13)
AST SERPL W P-5'-P-CCNC: 30 U/L (ref 13–39)
BASOPHILS # BLD AUTO: 0.03 THOUSANDS/ÂΜL (ref 0–0.1)
BASOPHILS NFR BLD AUTO: 1 % (ref 0–1)
BILIRUB SERPL-MCNC: 0.54 MG/DL (ref 0.2–1)
BUN SERPL-MCNC: 18 MG/DL (ref 5–25)
CALCIUM SERPL-MCNC: 9.4 MG/DL (ref 8.4–10.2)
CHLORIDE SERPL-SCNC: 101 MMOL/L (ref 96–108)
CHOLEST SERPL-MCNC: 204 MG/DL (ref ?–200)
CO2 SERPL-SCNC: 31 MMOL/L (ref 21–32)
CREAT SERPL-MCNC: 0.78 MG/DL (ref 0.6–1.3)
EOSINOPHIL # BLD AUTO: 0.29 THOUSAND/ÂΜL (ref 0–0.61)
EOSINOPHIL NFR BLD AUTO: 6 % (ref 0–6)
ERYTHROCYTE [DISTWIDTH] IN BLOOD BY AUTOMATED COUNT: 13.3 % (ref 11.6–15.1)
GFR SERPL CREATININE-BSD FRML MDRD: 87 ML/MIN/1.73SQ M
GLUCOSE P FAST SERPL-MCNC: 92 MG/DL (ref 65–99)
HCT VFR BLD AUTO: 42.6 % (ref 34.8–46.1)
HDLC SERPL-MCNC: 86 MG/DL
HGB BLD-MCNC: 13.7 G/DL (ref 11.5–15.4)
IMM GRANULOCYTES # BLD AUTO: 0.01 THOUSAND/UL (ref 0–0.2)
IMM GRANULOCYTES NFR BLD AUTO: 0 % (ref 0–2)
LDLC SERPL CALC-MCNC: 100 MG/DL (ref 0–100)
LYMPHOCYTES # BLD AUTO: 1.39 THOUSANDS/ÂΜL (ref 0.6–4.47)
LYMPHOCYTES NFR BLD AUTO: 28 % (ref 14–44)
MCH RBC QN AUTO: 31.4 PG (ref 26.8–34.3)
MCHC RBC AUTO-ENTMCNC: 32.2 G/DL (ref 31.4–37.4)
MCV RBC AUTO: 98 FL (ref 82–98)
MONOCYTES # BLD AUTO: 0.41 THOUSAND/ÂΜL (ref 0.17–1.22)
MONOCYTES NFR BLD AUTO: 8 % (ref 4–12)
NEUTROPHILS # BLD AUTO: 2.93 THOUSANDS/ÂΜL (ref 1.85–7.62)
NEUTS SEG NFR BLD AUTO: 57 % (ref 43–75)
NONHDLC SERPL-MCNC: 118 MG/DL
NRBC BLD AUTO-RTO: 0 /100 WBCS
PLATELET # BLD AUTO: 243 THOUSANDS/UL (ref 149–390)
PMV BLD AUTO: 10.7 FL (ref 8.9–12.7)
POTASSIUM SERPL-SCNC: 4 MMOL/L (ref 3.5–5.3)
PROT SERPL-MCNC: 7.3 G/DL (ref 6.4–8.4)
RBC # BLD AUTO: 4.36 MILLION/UL (ref 3.81–5.12)
SODIUM SERPL-SCNC: 139 MMOL/L (ref 135–147)
TRIGL SERPL-MCNC: 88 MG/DL (ref ?–150)
TSH SERPL DL<=0.05 MIU/L-ACNC: 2.19 UIU/ML (ref 0.45–4.5)
WBC # BLD AUTO: 5.06 THOUSAND/UL (ref 4.31–10.16)

## 2025-07-08 PROCEDURE — 80053 COMPREHEN METABOLIC PANEL: CPT

## 2025-07-08 PROCEDURE — 84443 ASSAY THYROID STIM HORMONE: CPT

## 2025-07-08 PROCEDURE — 36415 COLL VENOUS BLD VENIPUNCTURE: CPT

## 2025-07-08 PROCEDURE — 80061 LIPID PANEL: CPT

## 2025-07-08 PROCEDURE — 85025 COMPLETE CBC W/AUTO DIFF WBC: CPT

## 2025-07-10 ENCOUNTER — OFFICE VISIT (OUTPATIENT)
Age: 55
End: 2025-07-10
Payer: COMMERCIAL

## 2025-07-10 ENCOUNTER — TELEPHONE (OUTPATIENT)
Age: 55
End: 2025-07-10

## 2025-07-10 VITALS
BODY MASS INDEX: 24.62 KG/M2 | HEART RATE: 79 BPM | WEIGHT: 144.2 LBS | OXYGEN SATURATION: 99 % | DIASTOLIC BLOOD PRESSURE: 82 MMHG | HEIGHT: 64 IN | SYSTOLIC BLOOD PRESSURE: 130 MMHG | TEMPERATURE: 98.2 F

## 2025-07-10 DIAGNOSIS — G43.009 MIGRAINE WITHOUT AURA AND WITHOUT STATUS MIGRAINOSUS, NOT INTRACTABLE: Primary | ICD-10-CM

## 2025-07-10 DIAGNOSIS — H92.02 LEFT EAR PAIN: ICD-10-CM

## 2025-07-10 PROCEDURE — 99214 OFFICE O/P EST MOD 30 MIN: CPT | Performed by: FAMILY MEDICINE

## 2025-07-10 PROCEDURE — 96372 THER/PROPH/DIAG INJ SC/IM: CPT

## 2025-07-10 RX ORDER — METHYLPREDNISOLONE ACETATE 40 MG/ML
40 INJECTION, SUSPENSION INTRA-ARTICULAR; INTRALESIONAL; INTRAMUSCULAR; SOFT TISSUE ONCE
Status: DISCONTINUED | OUTPATIENT
Start: 2025-07-10 | End: 2025-07-10 | Stop reason: CLARIF

## 2025-07-10 RX ORDER — PREDNISONE 10 MG/1
TABLET ORAL
Qty: 14 TABLET | Refills: 0 | Status: SHIPPED | OUTPATIENT
Start: 2025-07-10

## 2025-07-10 RX ORDER — ELETRIPTAN HYDROBROMIDE 40 MG/1
40 TABLET, FILM COATED ORAL ONCE AS NEEDED
Qty: 10 TABLET | Refills: 5 | Status: SHIPPED | OUTPATIENT
Start: 2025-07-10

## 2025-07-10 RX ADMIN — METHYLPREDNISOLONE ACETATE 40 MG: 40 INJECTION, SUSPENSION INTRA-ARTICULAR; INTRALESIONAL; INTRAMUSCULAR; SOFT TISSUE at 14:47

## 2025-07-10 NOTE — ASSESSMENT & PLAN NOTE
{If prescribing CGRP gepants (Nurtec, Ubrelvy, Qulipta) or monoclonal antibodies (Emagality, Ajovy, Aimovig) that currently do not have a prior auth on file, click here to fill out prior auth smartform and then hit F2 with this smartlist to insert prior auth documentation (Optional):[id number]}    Orders:    MRI brain w wo contrast; Future    methylPREDNISolone acetate (DEPO-MEDROL) injection 40 mg    predniSONE 10 mg tablet; 3 pills daily for 2 days, 2 pills daily for 2 days, 1 pill daily for 2 days, half tablet daily for 2 days    eletriptan (RELPAX) 40 MG tablet; Take 1 tablet (40 mg total) by mouth once as needed for migraine for up to 1 dose may repeat in 2 hours if necessary

## 2025-07-10 NOTE — PROGRESS NOTES
Name: Thao Ahumada      : 1970      MRN: [id number]  Encounter Provider: Talon Lua DO  Encounter Date: 7/10/2025   Encounter department: [facility]IMARY [facility]ARE  :  Assessment & Plan  Migraine without aura and without status migrainosus, not intractable      Orders:    MRI brain w wo contrast; Future    methylPREDNISolone acetate (DEPO-MEDROL) injection 40 mg    predniSONE 10 mg tablet; 3 pills daily for 2 days, 2 pills daily for 2 days, 1 pill daily for 2 days, half tablet daily for 2 days    eletriptan (RELPAX) 40 MG tablet; Take 1 tablet (40 mg total) by mouth once as needed for migraine for up to 1 dose may repeat in 2 hours if necessary    Left ear pain    Orders:    methylPREDNISolone acetate (DEPO-MEDROL) injection 40 mg    predniSONE 10 mg tablet; 3 pills daily for 2 days, 2 pills daily for 2 days, 1 pill daily for 2 days, half tablet daily for 2 days           History of Present Illness   Patient is here with recurrent migraine.  Some nausea noted.  No significant photophobia at this time.  Patient with pressure in her ear also.  Some sinus pressure noted.  No significant rhinorrhea.  No fevers.  Labs were done.  No visual disturbance.  No rash noted.  No new weakness or numbness of the lower extremities or lower extremities.  Patient has left ear discomfort.    Migraine  Associated symptoms include ear pain, photophobia and sinus pressure.     Review of Systems   Constitutional: Negative.    HENT:  Positive for ear pain, sinus pressure and sinus pain.    Eyes:  Positive for photophobia. Negative for visual disturbance.   Respiratory: Negative.     Cardiovascular: Negative.    Gastrointestinal: Negative.    Endocrine: Negative.    Genitourinary: Negative.    Musculoskeletal: Negative.    Skin: Negative.    Allergic/Immunologic: Negative.    Neurological:  Positive for headaches.   Hematological: Negative.    Psychiatric/Behavioral: Negative.         Objective   /82 (BP  "Location: Left arm, Patient Position: Sitting, Cuff Size: Standard)   Pulse 79   Temp 98.2 °F (36.8 °C) (Temporal)   Ht 5' 4\" (1.626 m)   Wt 65.4 kg (144 lb 3.2 oz)   SpO2 99%   BMI 24.75 kg/m²      Physical Exam  Vitals and nursing note reviewed.   Constitutional:       General: She is not in acute distress.     Appearance: She is well-developed. She is ill-appearing. She is not diaphoretic.   HENT:      Head: Normocephalic and atraumatic.      Right Ear: Tympanic membrane, ear canal and external ear normal.      Left Ear: Tympanic membrane, ear canal and external ear normal.      Nose: Nose normal.      Mouth/Throat:      Mouth: Mucous membranes are moist.      Pharynx: No oropharyngeal exudate or posterior oropharyngeal erythema.     Eyes:      General:         Right eye: No discharge.         Left eye: No discharge.     Neck:      Thyroid: No thyromegaly.      Vascular: No carotid bruit.      Trachea: No tracheal deviation.     Cardiovascular:      Rate and Rhythm: Normal rate and regular rhythm.      Pulses: Normal pulses.      Heart sounds: Normal heart sounds. No murmur heard.     No gallop.   Pulmonary:      Effort: Pulmonary effort is normal. No respiratory distress.      Breath sounds: Normal breath sounds. No stridor. No wheezing or rales.   Chest:      Chest wall: No tenderness.   Abdominal:      General: Bowel sounds are normal.     Musculoskeletal:         General: No tenderness or deformity. Normal range of motion.      Cervical back: Normal range of motion and neck supple.   Lymphadenopathy:      Cervical: No cervical adenopathy.     Skin:     General: Skin is warm and dry.      Coloration: Skin is not pale.      Findings: No erythema or rash.     Neurological:      Mental Status: She is alert and oriented to person, place, and time. Mental status is at baseline.      Motor: No abnormal muscle tone.     Psychiatric:         Behavior: Behavior normal.         Thought Content: Thought content " normal.         Judgment: Judgment normal.

## 2025-07-10 NOTE — TELEPHONE ENCOUNTER
Patient called stating the samples of Ubrelvy are not helping her migraine. Patient would like to proceed with the shot she discussed with Dr. Lua. Patient would like to be seen today. Spoke with clerical Alison, who advised to send an encounter for Dr. Lua to review and advise further. Please advise and return patients call at [phone].

## 2025-07-10 NOTE — ASSESSMENT & PLAN NOTE
Orders:    methylPREDNISolone acetate (DEPO-MEDROL) injection 40 mg    predniSONE 10 mg tablet; 3 pills daily for 2 days, 2 pills daily for 2 days, 1 pill daily for 2 days, half tablet daily for 2 days

## 2025-07-11 ENCOUNTER — HOSPITAL ENCOUNTER (INPATIENT)
Facility: HOSPITAL | Age: 55
LOS: 1 days | Discharge: HOME | End: 2025-07-12
Attending: EMERGENCY MEDICINE | Admitting: INTERNAL MEDICINE
Payer: COMMERCIAL

## 2025-07-11 ENCOUNTER — APPOINTMENT (INPATIENT)
Dept: CT IMAGING | Facility: HOSPITAL | Age: 55
End: 2025-07-11
Payer: COMMERCIAL

## 2025-07-11 DIAGNOSIS — G43.901 STATUS MIGRAINOSUS: Primary | ICD-10-CM

## 2025-07-11 PROBLEM — M54.9 MUSCULOSKELETAL BACK PAIN: Status: ACTIVE | Noted: 2025-07-11

## 2025-07-11 LAB
ANION GAP SERPL CALCULATED.3IONS-SCNC: 4 MMOL/L (ref 4–13)
BASOPHILS # BLD AUTO: 0.03 THOUSANDS/ÂΜL (ref 0–0.1)
BASOPHILS NFR BLD AUTO: 0 % (ref 0–1)
BUN SERPL-MCNC: 16 MG/DL (ref 5–25)
CALCIUM SERPL-MCNC: 9.3 MG/DL (ref 8.4–10.2)
CHLORIDE SERPL-SCNC: 109 MMOL/L (ref 96–108)
CO2 SERPL-SCNC: 28 MMOL/L (ref 21–32)
CREAT SERPL-MCNC: 0.76 MG/DL (ref 0.6–1.3)
EOSINOPHIL # BLD AUTO: 0.05 THOUSAND/ÂΜL (ref 0–0.61)
EOSINOPHIL NFR BLD AUTO: 1 % (ref 0–6)
ERYTHROCYTE [DISTWIDTH] IN BLOOD BY AUTOMATED COUNT: 12.9 % (ref 11.6–15.1)
GFR SERPL CREATININE-BSD FRML MDRD: 89 ML/MIN/1.73SQ M
GLUCOSE SERPL-MCNC: 101 MG/DL (ref 65–140)
HCT VFR BLD AUTO: 40.2 % (ref 34.8–46.1)
HGB BLD-MCNC: 13.1 G/DL (ref 11.5–15.4)
IMM GRANULOCYTES # BLD AUTO: 0.01 THOUSAND/UL (ref 0–0.2)
IMM GRANULOCYTES NFR BLD AUTO: 0 % (ref 0–2)
LYMPHOCYTES # BLD AUTO: 1.66 THOUSANDS/ÂΜL (ref 0.6–4.47)
LYMPHOCYTES NFR BLD AUTO: 19 % (ref 14–44)
MCH RBC QN AUTO: 30.5 PG (ref 26.8–34.3)
MCHC RBC AUTO-ENTMCNC: 32.6 G/DL (ref 31.4–37.4)
MCV RBC AUTO: 94 FL (ref 82–98)
MONOCYTES # BLD AUTO: 0.73 THOUSAND/ÂΜL (ref 0.17–1.22)
MONOCYTES NFR BLD AUTO: 8 % (ref 4–12)
NEUTROPHILS # BLD AUTO: 6.37 THOUSANDS/ÂΜL (ref 1.85–7.62)
NEUTS SEG NFR BLD AUTO: 72 % (ref 43–75)
NRBC BLD AUTO-RTO: 0 /100 WBCS
PLATELET # BLD AUTO: 250 THOUSANDS/UL (ref 149–390)
PMV BLD AUTO: 9.8 FL (ref 8.9–12.7)
POTASSIUM SERPL-SCNC: 3.9 MMOL/L (ref 3.5–5.3)
RBC # BLD AUTO: 4.3 MILLION/UL (ref 3.81–5.12)
SODIUM SERPL-SCNC: 141 MMOL/L (ref 135–147)
WBC # BLD AUTO: 8.85 THOUSAND/UL (ref 4.31–10.16)

## 2025-07-11 PROCEDURE — 85025 COMPLETE CBC W/AUTO DIFF WBC: CPT | Performed by: PHYSICIAN ASSISTANT

## 2025-07-11 PROCEDURE — 99254 IP/OBS CNSLTJ NEW/EST MOD 60: CPT | Performed by: PSYCHIATRY & NEUROLOGY

## 2025-07-11 PROCEDURE — 36415 COLL VENOUS BLD VENIPUNCTURE: CPT | Performed by: PHYSICIAN ASSISTANT

## 2025-07-11 PROCEDURE — 96375 TX/PRO/DX INJ NEW DRUG ADDON: CPT

## 2025-07-11 PROCEDURE — 99284 EMERGENCY DEPT VISIT MOD MDM: CPT

## 2025-07-11 PROCEDURE — 96366 THER/PROPH/DIAG IV INF ADDON: CPT

## 2025-07-11 PROCEDURE — 99222 1ST HOSP IP/OBS MODERATE 55: CPT | Performed by: INTERNAL MEDICINE

## 2025-07-11 PROCEDURE — 96367 TX/PROPH/DG ADDL SEQ IV INF: CPT

## 2025-07-11 PROCEDURE — 96365 THER/PROPH/DIAG IV INF INIT: CPT

## 2025-07-11 PROCEDURE — 70496 CT ANGIOGRAPHY HEAD: CPT

## 2025-07-11 PROCEDURE — 70498 CT ANGIOGRAPHY NECK: CPT

## 2025-07-11 PROCEDURE — 80048 BASIC METABOLIC PNL TOTAL CA: CPT | Performed by: PHYSICIAN ASSISTANT

## 2025-07-11 RX ORDER — ACETAMINOPHEN 10 MG/ML
1000 INJECTION, SOLUTION INTRAVENOUS ONCE
Status: COMPLETED | OUTPATIENT
Start: 2025-07-11 | End: 2025-07-11

## 2025-07-11 RX ORDER — METOCLOPRAMIDE HYDROCHLORIDE 5 MG/ML
10 INJECTION INTRAMUSCULAR; INTRAVENOUS EVERY 8 HOURS SCHEDULED
Status: DISCONTINUED | OUTPATIENT
Start: 2025-07-11 | End: 2025-07-12 | Stop reason: HOSPADM

## 2025-07-11 RX ORDER — ONDANSETRON 2 MG/ML
4 INJECTION INTRAMUSCULAR; INTRAVENOUS EVERY 4 HOURS PRN
Status: DISCONTINUED | OUTPATIENT
Start: 2025-07-11 | End: 2025-07-12 | Stop reason: HOSPADM

## 2025-07-11 RX ORDER — PREDNISONE 10 MG/1
10 TABLET ORAL DAILY
Status: DISCONTINUED | OUTPATIENT
Start: 2025-07-14 | End: 2025-07-12 | Stop reason: HOSPADM

## 2025-07-11 RX ORDER — DIPHENHYDRAMINE HYDROCHLORIDE 50 MG/ML
25 INJECTION, SOLUTION INTRAMUSCULAR; INTRAVENOUS ONCE
Status: COMPLETED | OUTPATIENT
Start: 2025-07-11 | End: 2025-07-11

## 2025-07-11 RX ORDER — KETOROLAC TROMETHAMINE 30 MG/ML
30 INJECTION, SOLUTION INTRAMUSCULAR; INTRAVENOUS EVERY 8 HOURS SCHEDULED
Status: DISCONTINUED | OUTPATIENT
Start: 2025-07-11 | End: 2025-07-12 | Stop reason: HOSPADM

## 2025-07-11 RX ORDER — DIPHENHYDRAMINE HYDROCHLORIDE 50 MG/ML
25 INJECTION, SOLUTION INTRAMUSCULAR; INTRAVENOUS EVERY 8 HOURS SCHEDULED
Status: DISCONTINUED | OUTPATIENT
Start: 2025-07-11 | End: 2025-07-12 | Stop reason: HOSPADM

## 2025-07-11 RX ORDER — KETOROLAC TROMETHAMINE 30 MG/ML
15 INJECTION, SOLUTION INTRAMUSCULAR; INTRAVENOUS ONCE
Status: COMPLETED | OUTPATIENT
Start: 2025-07-11 | End: 2025-07-11

## 2025-07-11 RX ORDER — KETOROLAC TROMETHAMINE 30 MG/ML
30 INJECTION, SOLUTION INTRAMUSCULAR; INTRAVENOUS EVERY 8 HOURS SCHEDULED
Status: DISCONTINUED | OUTPATIENT
Start: 2025-07-11 | End: 2025-07-11

## 2025-07-11 RX ORDER — SODIUM CHLORIDE 9 MG/ML
100 INJECTION, SOLUTION INTRAVENOUS CONTINUOUS
Status: DISCONTINUED | OUTPATIENT
Start: 2025-07-11 | End: 2025-07-12 | Stop reason: HOSPADM

## 2025-07-11 RX ORDER — METOCLOPRAMIDE HYDROCHLORIDE 5 MG/ML
10 INJECTION INTRAMUSCULAR; INTRAVENOUS ONCE
Status: COMPLETED | OUTPATIENT
Start: 2025-07-11 | End: 2025-07-11

## 2025-07-11 RX ORDER — PREDNISONE 5 MG/1
5 TABLET ORAL DAILY
Status: DISCONTINUED | OUTPATIENT
Start: 2025-07-16 | End: 2025-07-12 | Stop reason: HOSPADM

## 2025-07-11 RX ORDER — ACETAMINOPHEN 325 MG/1
650 TABLET ORAL EVERY 4 HOURS PRN
Status: DISCONTINUED | OUTPATIENT
Start: 2025-07-11 | End: 2025-07-12 | Stop reason: HOSPADM

## 2025-07-11 RX ORDER — PREDNISONE 20 MG/1
20 TABLET ORAL DAILY
Status: DISCONTINUED | OUTPATIENT
Start: 2025-07-12 | End: 2025-07-12 | Stop reason: HOSPADM

## 2025-07-11 RX ORDER — MAGNESIUM SULFATE HEPTAHYDRATE 40 MG/ML
2 INJECTION, SOLUTION INTRAVENOUS ONCE
Status: COMPLETED | OUTPATIENT
Start: 2025-07-11 | End: 2025-07-11

## 2025-07-11 RX ORDER — METHOCARBAMOL 500 MG/1
500 TABLET, FILM COATED ORAL EVERY 8 HOURS SCHEDULED
Status: DISCONTINUED | OUTPATIENT
Start: 2025-07-11 | End: 2025-07-11

## 2025-07-11 RX ORDER — MAGNESIUM SULFATE HEPTAHYDRATE 40 MG/ML
2 INJECTION, SOLUTION INTRAVENOUS DAILY
Status: DISCONTINUED | OUTPATIENT
Start: 2025-07-12 | End: 2025-07-12 | Stop reason: HOSPADM

## 2025-07-11 RX ADMIN — METOCLOPRAMIDE 10 MG: 5 INJECTION, SOLUTION INTRAMUSCULAR; INTRAVENOUS at 21:52

## 2025-07-11 RX ADMIN — DIPHENHYDRAMINE HYDROCHLORIDE 25 MG: 50 INJECTION, SOLUTION INTRAMUSCULAR; INTRAVENOUS at 14:15

## 2025-07-11 RX ADMIN — DIPHENHYDRAMINE HYDROCHLORIDE 25 MG: 50 INJECTION, SOLUTION INTRAMUSCULAR; INTRAVENOUS at 21:51

## 2025-07-11 RX ADMIN — KETOROLAC TROMETHAMINE 15 MG: 30 INJECTION, SOLUTION INTRAMUSCULAR at 07:37

## 2025-07-11 RX ADMIN — SODIUM CHLORIDE 1000 ML: 0.9 INJECTION, SOLUTION INTRAVENOUS at 05:52

## 2025-07-11 RX ADMIN — METOCLOPRAMIDE 10 MG: 5 INJECTION, SOLUTION INTRAMUSCULAR; INTRAVENOUS at 14:15

## 2025-07-11 RX ADMIN — PREDNISONE 30 MG: 20 TABLET ORAL at 14:15

## 2025-07-11 RX ADMIN — MAGNESIUM SULFATE HEPTAHYDRATE 2 G: 40 INJECTION, SOLUTION INTRAVENOUS at 06:03

## 2025-07-11 RX ADMIN — SODIUM CHLORIDE 100 ML/HR: 0.9 INJECTION, SOLUTION INTRAVENOUS at 20:12

## 2025-07-11 RX ADMIN — KETOROLAC TROMETHAMINE 30 MG: 30 INJECTION, SOLUTION INTRAMUSCULAR; INTRAVENOUS at 21:52

## 2025-07-11 RX ADMIN — SODIUM CHLORIDE 100 ML/HR: 0.9 INJECTION, SOLUTION INTRAVENOUS at 10:40

## 2025-07-11 RX ADMIN — TIZANIDINE 4 MG: 4 TABLET ORAL at 17:39

## 2025-07-11 RX ADMIN — ACETAMINOPHEN 1000 MG: 10 INJECTION INTRAVENOUS at 05:52

## 2025-07-11 RX ADMIN — KETOROLAC TROMETHAMINE 30 MG: 30 INJECTION, SOLUTION INTRAMUSCULAR; INTRAVENOUS at 14:15

## 2025-07-11 RX ADMIN — METOCLOPRAMIDE 10 MG: 5 INJECTION, SOLUTION INTRAMUSCULAR; INTRAVENOUS at 05:55

## 2025-07-11 RX ADMIN — DIPHENHYDRAMINE HYDROCHLORIDE 25 MG: 50 INJECTION, SOLUTION INTRAMUSCULAR; INTRAVENOUS at 05:53

## 2025-07-11 RX ADMIN — IOHEXOL 85 ML: 350 INJECTION, SOLUTION INTRAVENOUS at 10:23

## 2025-07-11 RX ADMIN — TIZANIDINE 4 MG: 4 TABLET ORAL at 21:51

## 2025-07-11 NOTE — ASSESSMENT & PLAN NOTE
55 y.o. female with menstual migraines and history of melanoma who presented to the ED on 7/11/2025 secondary to persistent migraine x 1 week with associated photophobia, pressure sensation in her left ear and nausea.  Patient has not had no relief with over-the-counter analgesia as well as with the following medications prescribed by her PCP: Ubrelvy, methylprednisolone injection 40 mg, prednisone taper, Eletriptan.  In the ED patient was given migraine cocktail as detailed below ultimately with significant improvement in symptoms.    Work-up:  CTA H/N:  Patent major vessels of the [place] of [place] without significant stenosis. No aneurysm.  Unremarkable CT angiogram of the neck    Impression:  Patient reports migraine to be worse and longer lasting than any prior HAs. No focal neurologic deficits. HA pain has improved; however due to duration of pain, would recommend at serial dosing of migraine cocktail. Additionally due to HA different than prior headaches, will pursue MRI brain w wo, though low suspicion for pathology.    Recommendations:  S/p the following prescribed by PCP prior to arrival without relief:  Ubrelvy, methylprednisolone injection 40 mg, prednisone taper, Eletriptan  S/p the following in the ED:  Toradol, Reglan, Tylenol, Benadryl, magnesium and IV fluids  Since patient had significant improvement with above, will continue migraine cocktail as detailed above q8hrs in addition to  Continuing Prednisone taper as prescribed outpatient (plus PPI)  30mg today, 20mg x 3 days, 10mg x 2 days, 5mg x2 days  Tizanidine 4mg tid in the setting of musculoskeletal tenderness  Heat packs  Will order MRI brain w wo, as previously ordered by PCP, since HA is much longer than any prior HAs, though low suspicion for acute pathology

## 2025-07-11 NOTE — ED PROVIDER NOTES
ED Disposition       None          Assessment & Plan       Medical Decision Making  Patient is well appearing and neurologically intact. Headache was not acute or maximal in onset. There are no high-risk variables including neck pain/stiffness, witnessed LOC, onset during exertion, thunderclap headache quality. There is no limited neck flexion on examination. History and physical exam not suggestive of a secondary headache.  Do not feel that further imaging or workup are warranted at this time.     Patient has been seeing PCP for migraine.  PCP has ordered outpatient MRI.  Patient states she will schedule an appointment to get MRI.  Has tried ubrelvy, methylprednisolone shot, prednisone, and eletriptan without relief.     Plan: Will trial a migraine cocktail: IV fluids, reglan, magnesium, tylenol, and benadryl.     Patient reports improvement of migraine following these medications.  Still has some headache, will give Toradol and reevaluate.    Patient still with migraine however does state that it is much improved. Patient with persistent migraine despite multiple different therapies outpatient and while in ED. Pending further therapy and neuro input, will sign out patient to Addy Ernandez PA-C    Amount and/or Complexity of Data Reviewed  Labs: ordered.  Radiology: ordered.    Risk  Prescription drug management.  Decision regarding hospitalization.             Medications   sodium chloride 0.9 % bolus 1,000 mL (1,000 mL Intravenous New Bag 7/11/25 0548)   magnesium sulfate 2 g/50 mL IVPB (premix) 2 g (has no administration in time range)   diphenhydrAMINE (BENADRYL) injection 25 mg (25 mg Intravenous Given 7/11/25 0553)   metoclopramide (REGLAN) injection 10 mg (10 mg Intravenous Given 7/11/25 0555)   acetaminophen (Ofirmev) injection 1,000 mg (1,000 mg Intravenous New Bag 7/11/25 0552)       ED Risk Strat Scores                    No data recorded                            History of Present Illness        Chief Complaint   Patient presents with    Headache     Pt reports recurrent headache associated with nausea, saw PCP was given steroid shot and other medication and has not had any relief. Denies head injury       Past Medical History[1]   Past Surgical History[2]   Family History[3]   Social History[4]   E-Cigarette/Vaping    E-Cigarette Use Never User       E-Cigarette/Vaping Substances    Nicotine No     THC No     CBD No     Flavoring No     Other No     Unknown No       I have reviewed and agree with the history as documented.     Patient is a 55-year-old female with PMH of menstrual migraines presenting to the ED for evaluation of migraine x 1 week.  Patient states that last Thursday she began having migraines that are intermittent.  Waxes and wanes in intensity.  No aura.  States he feels a pressure sensation in the back of her head wrapping to the front.  Patient admits to sensitivity to light.  Nausea without vomiting.  Pressure sensation in left ear.  Patient denies fever, chills, sweats, numbness, lightheaded/dizziness, loss consciousness, chest pain, abdominal pain, diarrhea, cough, congestion, sore throat, runny nose, etc. denies any falls or head trauma.   Patient has been seen by her PCP twice for this since it started on Thursday.  Has tried ubrelvy without relief.  After second visit, patient was given methylprednisolone shot, prednisone, eletriptan.  Patient still without relief.  Denies any new symptoms since being seen yesterday by PCP.  Does state that headache feels a little worse.  Patient does state that on Friday she did have some vaginal spotting and she is supposed to get her period soon.           Review of Systems   Constitutional:  Negative for chills and fever.   HENT:  Positive for ear pain. Negative for congestion, rhinorrhea, sore throat, trouble swallowing and voice change.    Eyes:  Negative for pain and visual disturbance.   Respiratory:  Negative for cough and shortness  of breath.    Cardiovascular:  Negative for chest pain and palpitations.   Gastrointestinal:  Positive for nausea. Negative for abdominal pain, blood in stool, constipation, diarrhea and vomiting.   Genitourinary:  Negative for dysuria and hematuria.   Musculoskeletal:  Negative for arthralgias and back pain.   Skin:  Negative for color change and rash.   Neurological:  Positive for headaches. Negative for dizziness, seizures, syncope, weakness, light-headedness and numbness.   All other systems reviewed and are negative.          Objective       ED Triage Vitals [07/11/25 0450]   Temperature Pulse Blood Pressure Respirations SpO2 Patient Position - Orthostatic VS   98.1 °F (36.7 °C) 82 (!) 171/82 14 98 % --      Temp src Heart Rate Source BP Location FiO2 (%) Pain Score    -- -- -- -- 7      Vitals      Date and Time Temp Pulse SpO2 Resp BP Pain Score FACES Pain Rating User   07/11/25 0450 98.1 °F (36.7 °C) 82 98 % 14 171/82 7 -- EP            Physical Exam  Vitals and nursing note reviewed.   Constitutional:       General: She is not in acute distress.     Appearance: She is well-developed. She is not ill-appearing, toxic-appearing or diaphoretic.   HENT:      Head: Normocephalic and atraumatic.      Right Ear: Tympanic membrane, ear canal and external ear normal.      Left Ear: Tympanic membrane, ear canal and external ear normal.      Nose: Nose normal. No congestion or rhinorrhea.      Mouth/Throat:      Mouth: Mucous membranes are moist.      Pharynx: No oropharyngeal exudate or posterior oropharyngeal erythema.     Eyes:      General: No visual field deficit or scleral icterus.        Right eye: No discharge.         Left eye: No discharge.      Extraocular Movements: Extraocular movements intact.      Conjunctiva/sclera: Conjunctivae normal.      Pupils: Pupils are equal, round, and reactive to light.       Cardiovascular:      Rate and Rhythm: Normal rate and regular rhythm.      Pulses: Normal pulses.       Heart sounds: Normal heart sounds. No murmur heard.  Pulmonary:      Effort: Pulmonary effort is normal. No respiratory distress.      Breath sounds: Normal breath sounds. No stridor. No wheezing, rhonchi or rales.   Abdominal:      General: There is no distension.      Palpations: Abdomen is soft.      Tenderness: There is no abdominal tenderness. There is no right CVA tenderness, left CVA tenderness, guarding or rebound.     Musculoskeletal:         General: No swelling.      Cervical back: Normal range of motion and neck supple. No rigidity.     Skin:     General: Skin is warm and dry.      Capillary Refill: Capillary refill takes less than 2 seconds.      Coloration: Skin is not jaundiced or pale.      Findings: No bruising, erythema, lesion or rash.     Neurological:      General: No focal deficit present.      Mental Status: She is alert and oriented to person, place, and time.      GCS: GCS eye subscore is 4. GCS verbal subscore is 5. GCS motor subscore is 6.      Cranial Nerves: No cranial nerve deficit, dysarthria or facial asymmetry.      Sensory: Sensation is intact. No sensory deficit.      Motor: Motor function is intact. No weakness, tremor, abnormal muscle tone, seizure activity or pronator drift.      Coordination: Coordination is intact. Romberg sign negative. Coordination normal. Finger-Nose-Finger Test and Heel to Shin Test normal.      Gait: Gait is intact. Gait normal.     Psychiatric:         Mood and Affect: Mood normal.         Results Reviewed       None            No orders to display       Procedures    ED Medication and Procedure Management   Prior to Admission Medications   Prescriptions Last Dose Informant Patient Reported? Taking?   aspirin-acetaminophen-caffeine (EXCEDRIN MIGRAINE) 250-250-65 MG per tablet   Yes No   Sig: Take 1 tablet by mouth every 6 (six) hours as needed for headaches   Patient not taking: Reported on 7/10/2025   eletriptan (RELPAX) 40 MG tablet   No No   Sig: Take  1 tablet (40 mg total) by mouth once as needed for migraine for up to 1 dose may repeat in 2 hours if necessary   ibuprofen (MOTRIN) 200 mg tablet   Yes No   Sig: Take by mouth every 6 (six) hours as needed for mild pain   predniSONE 10 mg tablet   No No   Sig: 3 pills daily for 2 days, 2 pills daily for 2 days, 1 pill daily for 2 days, half tablet daily for 2 days      Facility-Administered Medications Last Administration Doses Remaining   methylPREDNISolone acetate (DEPO-MEDROL) injection 40 mg 7/10/2025  2:47 PM 0        Patient's Medications   Discharge Prescriptions    No medications on file     No discharge procedures on file.  ED SEPSIS DOCUMENTATION                [1]   Past Medical History:  Diagnosis Date    Headache(784.0)     Melanoma (HCC)     Thoracic myofascial strain 2018   [2]   Past Surgical History:  Procedure Laterality Date     SECTION      COLONOSCOPY     [3]   Family History  Problem Relation Name Age of Onset    Hypertension Mother Kalie Clark    [4]   Social History  Tobacco Use    Smoking status: Never    Smokeless tobacco: Never   Vaping Use    Vaping status: Never Used   Substance Use Topics    Alcohol use: Yes     Alcohol/week: 2.0 standard drinks of alcohol     Types: 2 Glasses of wine per week     Comment: Social    Drug use: No        Amy Rivera PA-C  25 [id number]

## 2025-07-11 NOTE — H&P
H&P - Hospitalist   Name: Thao Ahumada 55 y.o. female I MRN: [id number]  Unit/Bed#: ED-23 I Date of Admission: 7/11/2025   Date of Service: 7/11/2025 I Hospital Day: 0     Assessment & Plan  Status migrainosus  History of menstrual migraines who presents to the hospital with refractory headaches  Has been persistent over the past week.  Did see PCP and failed outpatient management  Given migraine cocktail in ED with improvement  Consult neurology for further recommendations.  Continue migraine cocktail and antispasmodics.  Musculoskeletal back pain  Does have cervical musculoskeletal spasm  Will schedule methocarbamol  Well adult exam  Patient otherwise healthy without other medical comorbidities    VTE Pharmacologic Prophylaxis: VTE Score: 1 Low Risk (Score 0-2) - Encourage Ambulation.  Code Status: Level 1 - Full Code   Discussion with family: Updated  () at bedside.    Anticipated Length of Stay: Patient will be admitted on an inpatient basis with an anticipated length of stay of greater than 2 midnights secondary to status migrainosus.    Chief Complaint:     Headache (Pt reports recurrent headache associated with nausea, saw PCP was given steroid shot and other medication and has not had any relief. Denies head injury)    History of Present Illness  Thao Ahumada is a 55 y.o. female with a PMH of menstrual migraines who presents with persistent headache.  Patient did see the PCP and was given steroid shot and oral medications without improvement.  Usually headaches are related to menses however at this time it is not.  Headache is posterior radiates anteriorly up the neck.  Not associated or worsened with light/sound.  No fevers chills.  3 weeks ago did visit the [place] but otherwise no other related history.    Review of Systems   Constitutional:  Negative for chills and fever.   HENT:  Negative for facial swelling and trouble swallowing.    Eyes:  Negative for visual  disturbance.   Respiratory:  Negative for shortness of breath.    Cardiovascular:  Negative for chest pain and palpitations.   Gastrointestinal:  Negative for abdominal distention, abdominal pain, diarrhea, nausea and vomiting.   Genitourinary:  Negative for hematuria.   Musculoskeletal:  Positive for back pain and neck pain. Negative for myalgias.   Skin:  Negative for rash.   Neurological:  Positive for headaches. Negative for facial asymmetry and speech difficulty.   Psychiatric/Behavioral:  The patient is not nervous/anxious.    All other systems reviewed and are negative.      Past Medical and Surgical History:   Past Medical History[1]  Past Surgical History[2]  Meds/Allergies:  Allergies: Allergies[3]  Prior to Admission Medications   Prescriptions Last Dose Informant Patient Reported? Taking?   eletriptan (RELPAX) 40 MG tablet   No No   Sig: Take 1 tablet (40 mg total) by mouth once as needed for migraine for up to 1 dose may repeat in 2 hours if necessary   ibuprofen (MOTRIN) 200 mg tablet   Yes No   Sig: Take by mouth every 6 (six) hours as needed for mild pain   predniSONE 10 mg tablet   No No   Sig: 3 pills daily for 2 days, 2 pills daily for 2 days, 1 pill daily for 2 days, half tablet daily for 2 days      Facility-Administered Medications: None     Social History:     Social History     Socioeconomic History    Marital status: /Civil Union     Spouse name: Not on file    Number of children: Not on file    Years of education: Not on file    Highest education level: Not on file   Occupational History    Not on file   Tobacco Use    Smoking status: Never    Smokeless tobacco: Never   Vaping Use    Vaping status: Never Used   Substance and Sexual Activity    Alcohol use: Yes     Alcohol/week: 2.0 standard drinks of alcohol     Types: 2 Glasses of wine per week     Comment: Social    Drug use: No    Sexual activity: Yes     Partners: Male     Birth control/protection: Male Sterilization   Other  Topics Concern    Not on file   Social History Narrative    Not on file     Social Drivers of Health     Financial Resource Strain: Low Risk  (3/15/2021)    Overall Financial Resource Strain (CARDIA)     Difficulty of Paying Living Expenses: Not hard at all   Food Insecurity: No Food Insecurity (3/15/2021)    Hunger Vital Sign     Worried About Running Out of Food in the Last Year: Never true     Ran Out of Food in the Last Year: Never true   Transportation Needs: No Transportation Needs (3/15/2021)    PRAPARE - Transportation     Lack of Transportation (Medical): No     Lack of Transportation (Non-Medical): No   Physical Activity: Sufficiently Active (9/9/2020)    Exercise Vital Sign     Days of Exercise per Week: 4 days     Minutes of Exercise per Session: 60 min   Stress: No Stress Concern Present (9/9/2020)    [employer] - Occupational Stress Questionnaire     Feeling of Stress : Not at all   Social Connections: Moderately Integrated (9/9/2020)    Social Connection and Isolation Panel     Frequency of Communication with Friends and Family: More than three times a week     Frequency of Social Gatherings with Friends and Family: More than three times a week     Attends Yarsani Services: 1 to 4 times per year     Active Member of Clubs or Organizations: No     Attends Club or Organization Meetings: Never     Marital Status:    Intimate Partner Violence: Not At Risk (9/9/2020)    Humiliation, Afraid, Rape, and Kick questionnaire     Fear of Current or Ex-Partner: No     Emotionally Abused: No     Physically Abused: No     Sexually Abused: No   Housing Stability: Not on file     Patient Pre-hospital Living Situation: Home  Patient Pre-hospital Level of Mobility: walks  Patient Pre-hospital Diet Restrictions:     Objective   Vitals:   Blood Pressure: (!) 171/82 (07/11/25 0450)  Pulse: 82 (07/11/25 0450)  Temperature: 98.1 °F (36.7 °C) (07/11/25 0450)  Respirations: 14 (07/11/25  0450)  Weight - Scale: 65.8 kg (145 lb 1 oz) (07/11/25 0448)  SpO2: 98 % (07/11/25 0450)    Physical Exam  Vitals reviewed.   Constitutional:       General: She is not in acute distress.     Appearance: Normal appearance.   HENT:      Head: Atraumatic.     Eyes:      General: No scleral icterus.     Extraocular Movements: Extraocular movements intact.       Cardiovascular:      Rate and Rhythm: Regular rhythm.      Heart sounds:      No gallop.   Pulmonary:      Effort: No respiratory distress.      Breath sounds: Decreased breath sounds present. No wheezing.   Abdominal:      General: Bowel sounds are normal.      Palpations: Abdomen is soft.      Tenderness: There is no guarding or rebound.     Musculoskeletal:         General: No deformity.      Cervical back: Tenderness present.     Skin:     General: Skin is warm.      Coloration: Skin is not jaundiced.     Neurological:      General: No focal deficit present.      Mental Status: She is alert.      Motor: No weakness.     Psychiatric:         Mood and Affect: Mood normal.         Additional Data:   Lab Results: I have reviewed the following results:  Results from last 7 days   Lab Units 07/11/25  0918   WBC Thousand/uL 8.85   HEMOGLOBIN g/dL 13.1   HEMATOCRIT % 40.2   PLATELETS Thousands/uL 250   SEGS PCT % 72   LYMPHO PCT % 19   MONO PCT % 8   EOS PCT % 1     Results from last 7 days   Lab Units 07/11/25  0918 07/08/25  0901   SODIUM mmol/L 141 139   POTASSIUM mmol/L 3.9 4.0   CHLORIDE mmol/L 109* 101   CO2 mmol/L 28 31   ANION GAP mmol/L 4 7   BUN mg/dL 16 18   CREATININE mg/dL 0.76 0.78   CALCIUM mg/dL 9.3 9.4   ALBUMIN g/dL  --  4.6   TOTAL BILIRUBIN mg/dL  --  0.54   ALK PHOS U/L  --  59   ALT U/L  --  24   AST U/L  --  30   EGFR ml/min/1.73sq m 89 87   GLUCOSE RANDOM mg/dL 101  --          Lines/Drains  Invasive Devices       Peripheral Intravenous Line  Duration             Peripheral IV 07/11/25 Right Antecubital <1 day                    Imaging:    Personally reviewed the following image studies in PACS and associated radiology reports:  No results found.    EKG, Pathology, and Other Studies Reviewed on Admission:       Administrative Statements   Reviewed outpatient PCP notes in detail    ** Please Note: This note has been constructed using a voice recognition system. **         [1]   Past Medical History:  Diagnosis Date    Headache(784.0)     Melanoma (HCC)     Thoracic myofascial strain 2018   [2]   Past Surgical History:  Procedure Laterality Date     SECTION      COLONOSCOPY     [3]   Allergies  Allergen Reactions    Penicillins Hives    Sulfa Antibiotics Hives    Sulfasalazine

## 2025-07-11 NOTE — ED CARE HANDOFF
Emergency Department Sign Out Note        Sign out and transfer of care from Amy Rivera PA-C. See Separate Emergency Department note.     The patient, Thao Ahumada, was evaluated by the previous provider for persistent migraine x 1 week.    Workup Completed:  Results Reviewed       Procedure Component Value Units Date/Time    CBC and differential [[id number]]     Lab Status: No result Specimen: Blood     Basic metabolic panel [[id number]]     Lab Status: No result Specimen: Blood           CTA head and neck with and without contrast    (Results Pending)         ED Course / Workup Pending (followup):  Patient is a 55-year-old female with past medical history of migraines presenting to the ER for persistent migraine x 1 week.  Patient was seen by her PCP who attempted treatment with multiple different medications.  In the ER patient has received Toradol, Reglan, Tylenol, Benadryl, magnesium, and IV fluids.  Despite all of these medications she does still have a persistent headache.  Currently rates it a 4 out of 10.  I discussed this case with neurology Dr. Collazo who recommends admission for status migrainosus and attempting treatment inpatient with Depacon and gabapentin.  He also recommends completing CTA of the head and neck in the ER.  I discussed this plan with patient who is understanding and agreeable with plan.  Ordered CT of the head and neck in ER.  Discussed this case with inpatient Dr. Jose L Day who accepts patient for inpatient admission at this time.  Patient has remained stable while under my care in the ER and is stable during time of admission.        No data recorded                          ED Course as of 07/11/25 0902   Fri Jul 11, 2025   0807 1 wk of intractable migraines, menstrual migraine. Occipital and radiates to front. Tried ubrevly on the 7th, methylprednisolone yesterday and started course of steroid taper, el     Procedures  Medical Decision Making  Amount and/or Complexity of  Data Reviewed  Labs: ordered.  Radiology: ordered.    Risk  Prescription drug management.  Decision regarding hospitalization.            Disposition  Final diagnoses:   Status migrainosus     Time reflects when diagnosis was documented in both MDM as applicable and the Disposition within this note       Time User Action Codes Description Comment    7/11/2025  8:45 AM Awais Daly Add [G43.901] Status migrainosus           ED Disposition       ED Disposition   Admit    Condition   Stable    Date/Time   Fri Jul 11, 2025  9:01 AM    Comment   Case was discussed with Dr. Day and the patient's admission status was agreed to be Admission Status: inpatient status to the service of Dr. Day .               Follow-up Information    None       Patient's Medications   Discharge Prescriptions    No medications on file     No discharge procedures on file.       ED Provider  Electronically Signed by     Awais Daly PA-C  07/11/25 0902

## 2025-07-11 NOTE — CONSULTS
Consultation - Neurology   Name: Thao Ahumada 55 y.o. female I MRN: [id number]  Unit/Bed#: ED-23 I Date of Admission: 7/11/2025   Date of Service: 7/11/2025 I Hospital Day: 0   Inpatient consult to Neurology  Consult performed by: Ayanna Jackson PA-C  Consult ordered by: Jose L Day DO        Physician Requesting Evaluation: Jose L Day DO   Reason for Evaluation / Principal Problem: Status migrainosus    Assessment & Plan  Status migrainosus  55 y.o. female with menstual migraines and history of melanoma who presented to the ED on 7/11/2025 secondary to persistent migraine x 1 week with associated photophobia, pressure sensation in her left ear and nausea.  Patient has not had no relief with over-the-counter analgesia as well as with the following medications prescribed by her PCP: Ubrelvy, methylprednisolone injection 40 mg, prednisone taper, Eletriptan.  In the ED patient was given migraine cocktail as detailed below ultimately with significant improvement in symptoms.    Work-up:  CTA H/N:  Patent major vessels of the [place] without significant stenosis. No aneurysm.  Unremarkable CT angiogram of the neck    Impression:  Patient reports migraine to be worse and longer lasting than any prior HAs. No focal neurologic deficits. HA pain has improved; however due to duration of pain, would recommend at serial dosing of migraine cocktail. Additionally due to HA different than prior headaches, will pursue MRI brain w wo, though low suspicion for pathology.    Recommendations:  S/p the following prescribed by PCP prior to arrival without relief:  Ubrelvy, methylprednisolone injection 40 mg, prednisone taper, Eletriptan  S/p the following in the ED:  Toradol, Reglan, Tylenol, Benadryl, magnesium and IV fluids  Since patient had significant improvement with above, will continue migraine cocktail as detailed above q8hrs in addition to  Continuing Prednisone taper as prescribed outpatient (plus  PPI)  30mg today, 20mg x 3 days, 10mg x 2 days, 5mg x2 days  Tizanidine 4mg tid in the setting of musculoskeletal tenderness  Heat packs  Will order MRI brain w wo, as previously ordered by PCP, since HA is much longer than any prior HAs, though low suspicion for acute pathology    Thao Ahumada will need follow-up in in 6 weeks with headache team for Migraine in 60 minute appointment. They will not require outpatient neurological testing. Message sent to schedulers.    History of Present Illness   Thao Ahumada is a 55 y.o. right handed female with menstual migraines and history of melanoma who presented to the ED on 7/11/2025 secondary to persistent migraine x 1 week, initially rated 7/10 shorlty after patient's arrival at 0450.  Patient reported that she first developed a migraine on 7/3 which was described as intermittent, waxing and waning in intensity.  She described a pressure sensation in the back of her head wrapping to the front as well as admitting to photophobia, a pressure sensation in her left ear and nausea. No reports of aura. Excedrin migraine and ibuprofen provided no relief. Patient has seen PCP x 2 since migraine started.  She has tried Ubrelvy without relief.  Following her second visit on 7/10, she was given methylprednisolone injection 40 mg, prednisone taper (beginning at 30mg over 5 days), and eletriptan 40mg also without relief. PCP did order MRI w wo as well but it has not yet been completed. Patient reports her headache to be a little worse than it was yesterday.  She does note that she is supposed to have her menses soon.  In the ED /82.  Labs grossly unremarkable.  Non focal neuro exam.   In the ED, patient received Toradol, Reglan, Tylenol, Benadryl, magnesium and IV fluids.  Despite these medications, she reports a persistent headache currently rated 4/10.  Case was discussed with Dr. Collazo who recommended admission and additional trial of Depacon and gabapentin.  CTA head  and neck was also recommended.    Patient has not been seen by Neurology in the past.  Upon review of PCP notes, there is a family history of factor V Leiden.  It appears she was going to get tested for this at some point though I do not see the result.    Upon neuro exam mid day, patient reported that she is much improved stating her HA is now a 2.  She reports that the migraine cocktail helped significantly.  In describing the location of her headache she explained that it starts at the base of the occiput and then radiates up over the top of her head into the frontal region.  She also admits to muscle tenderness and soreness in her bilateral trapezius and cervical paraspinal muscles.  Patient states that this is the same type of typical menstrual migraine headache that she gets however this 1 is just much worse and lasting much longer.  She notes that she typically gets these monthly but they typically resolve with Tylenol.  She has been getting these for the past 7 or 8 years.    Additionally, patient also reports having approximately 8-10 episodes of visual disturbance in the last year described as a blurred line in her left eye which lasts about 15 minutes and then fades away.  She states that it is not associated with any head pain but is wondering if it could be a different type of migraine.    Review of Systems   Eyes:  Negative for visual disturbance.   Respiratory:  Negative for shortness of breath.    Cardiovascular:  Negative for chest pain.   Neurological:  Positive for headaches (improved). Negative for dizziness, speech difficulty, weakness and numbness.        Medical History Review: I have reviewed the patient's PMH, PSH, Social History, Family History, Meds, and Allergies     Objective :  Temp:  [98.1 °F (36.7 °C)] 98.1 °F (36.7 °C)  HR:  [82] 82  BP: (171)/(82) 171/82  Resp:  [14] 14  SpO2:  [98 %] 98 %    Physical Exam  Constitutional:       General: She is awake. She is not in acute distress.      Appearance: Normal appearance. She is not ill-appearing, toxic-appearing or diaphoretic.   HENT:      Head: Normocephalic and atraumatic.     Eyes:      Extraocular Movements: Extraocular movements intact.     Pulmonary:      Effort: Pulmonary effort is normal. No respiratory distress.     Musculoskeletal:      Cervical back: Normal range of motion. No rigidity.     Neurological:      Mental Status: She is alert.     Psychiatric:         Speech: Speech normal.     Neurological Exam  Mental Status  Awake and alert. Oriented to person, place, time and situation. Speech is normal. Language is fluent with no aphasia. Attention and concentration are normal.    Cranial Nerves  CN II: Visual acuity is normal. Visual fields full to confrontation.  CN III, IV, VI: Extraocular movements intact bilaterally.  CN V: Facial sensation is normal.  CN VII: Full and symmetric facial movement.  CN XII: Tongue midline without atrophy or fasciculations.    Motor  Normal muscle bulk throughout. No fasciculations present. No pronator drift.    Sensory  Light touch is normal in upper and lower extremities.     Reflexes  DTRs 2 throughout.    Coordination  Right: Finger-to-nose normal. Rapid alternating movement normal.Left: Finger-to-nose normal. Rapid alternating movement normal.        Lab Results: I have reviewed the following results:I have personally reviewed pertinent reports.    Recent Labs     07/11/25  0918   WBC 8.85   HGB 13.1   HCT 40.2      SODIUM 141   K 3.9   *   CO2 28   BUN 16   CREATININE 0.76   GLUC 101     VTE Prophylaxis: None currently

## 2025-07-11 NOTE — ASSESSMENT & PLAN NOTE
History of menstrual migraines who presents to the hospital with refractory headaches  Has been persistent over the past week.  Did see PCP and failed outpatient management  Given migraine cocktail in ED with improvement  Consult neurology for further recommendations.  Continue migraine cocktail and antispasmodics.

## 2025-07-11 NOTE — DISCHARGE INSTR - AVS FIRST PAGE
Recommend outpatient migraine prophylaxis with magnesium 400 mg twice daily and riboflavin 200 mg twice daily

## 2025-07-11 NOTE — UTILIZATION REVIEW
Initial Clinical Review    Admission: Date/Time/Statement:   Admission Orders (From admission, onward)       Ordered        07/11/25 0902  INPATIENT ADMISSION  Once                          Orders Placed This Encounter   Procedures    INPATIENT ADMISSION     Standing Status:   Standing     Number of Occurrences:   1     Level of Care:   Med Surg [16]     Estimated length of stay:   More than 2 Midnights     Certification:   I certify that inpatient services are medically necessary for this patient for a duration of greater than two midnights. See H&P and MD Progress Notes for additional information about the patient's course of treatment.     ED Arrival Information       Expected   -    Arrival   7/11/2025 04:46    Acuity   Urgent              Means of arrival   Walk-In    Escorted by   Family Member    Service   Hospitalist    Admission type   Emergency              Arrival complaint   Head pain             Chief Complaint   Patient presents with    Headache     Pt reports recurrent headache associated with nausea, saw PCP was given steroid shot and other medication and has not had any relief. Denies head injury       Initial Presentation: 55 y.o. female presents to ED from home due to persistent headache over the past week. Failed outpatient management, steroid and oral meds given by PCP without improvement. History of menstrual migraines. Headache is posterior, radiates anteriorly up the neck. Exam: BP elevated 171/82, decreased breath sounds , + cervical back tenderness Admitted as Inpatient to med surg for status migrainosus, musculoskeletal back pain, cervical musculoskeletal spasm Plan Consult neurology, continue migraine cocktail and anti-spasmodics     Neurology 7/11   55 y.o. female with menstrual migraines and history of melanoma  presents to ED secondary to persistent migraine x 1 week with associated photophobia, pressure sensation in her left ear and nausea.  No relief with over-the-counter analgesia  as  Ubrelvy, methylprednisolone injection 40 mg, prednisone taper, Eletriptan. prescribed by her PCP No focal neurologic deficits  Recommend Continue migraine cocktail:Toradol, Reglan, Tylenol, Benadryl, magnesium and IV fluids     Anticipated Length of Stay/Certification Statement:  Patient will be admitted on an inpatient basis with an anticipated length of stay of greater than 2 midnights secondary to status migrainosus.     Date:   7/12  Day 2:    GCS 15  Rates Pain 4/10 head  Os sat  97 on RA   AMbulatory     ED Treatment-Medication Administration from 07/11/2025 0446 to 07/11/2025 1028         Date/Time Order Dose Route Action     07/11/2025 0552 sodium chloride 0.9 % bolus 1,000 mL 1,000 mL Intravenous New Bag     07/11/2025 0553 diphenhydrAMINE (BENADRYL) injection 25 mg 25 mg Intravenous Given     07/11/2025 0555 metoclopramide (REGLAN) injection 10 mg 10 mg Intravenous Given     07/11/2025 0603 magnesium sulfate 2 g/50 mL IVPB (premix) 2 g 2 g Intravenous New Bag     07/11/2025 0552 acetaminophen (Ofirmev) injection 1,000 mg 1,000 mg Intravenous New Bag     07/11/2025 0737 ketorolac (TORADOL) injection 15 mg 15 mg Intravenous Given     07/11/2025 1023 iohexol (OMNIPAQUE) 350 MG/ML injection (MULTI-DOSE) 85 mL 85 mL Intravenous Given            Scheduled Medications:  diphenhydrAMINE, 25 mg, Intravenous, Q8H BRAXTON  ketorolac, 30 mg, Intravenous, Q8H BRAXTON  [START ON 7/12/2025] magnesium sulfate, 2 g, Intravenous, Daily  metoclopramide, 10 mg, Intravenous, Q8H BRAXTON  tiZANidine, 4 mg, Oral, TID      Continuous IV Infusions:  sodium chloride, 100 mL/hr, Intravenous, Continuous      PRN Meds:  acetaminophen, 650 mg, Oral, Q4H PRN  ondansetron, 4 mg, Intravenous, Q4H PRN      ED Triage Vitals [07/11/25 0450]   Temperature Pulse Respirations Blood Pressure SpO2 Pain Score   98.1 °F (36.7 °C) 82 14 (!) 171/82 98 % 7     Weight (last 2 days)       Date/Time Weight    07/11/25 1041 65.8 (145.06)    07/11/25 0448 65.8  (145.06)            Vital Signs (last 3 days)       Date/Time Temp Pulse Resp BP MAP (mmHg) SpO2 O2 Device Patient Position - Orthostatic VS [place] Coma Scale Score Pain    07/11/25 1044 -- -- -- -- -- -- -- -- 15 --    07/11/25 1041 97.6 °F (36.4 °C) 80 18 144/79 -- -- -- -- -- --    07/11/25 10:32:40 97.6 °F (36.4 °C) 80 -- 144/79 101 99 % -- -- -- --    07/11/25 1032 97.6 °F (36.4 °C) 73 18 144/79 101 99 % None (Room air) Lying -- 3    07/11/25 10:31:08 -- 75 -- 144/79 101 99 % -- -- -- --    07/11/25 0500 -- -- -- -- -- -- -- -- 15 --    07/11/25 0450 98.1 °F (36.7 °C) 82 14 171/82 -- 98 % -- -- -- 7              Pertinent Labs/Diagnostic Test Results:   Radiology:  CTA head and neck with and without contrast   Final Interpretation by Anjali Malone MD (07/11 1053)      CT Brain: No acute intracranial CT abnormality.      CT Angiography:      1.  Patent major vessels of the [place] without significant stenosis. No aneurysm.   2.  Unremarkable CT angiogram of the neck      Workstation performed: [id number]           Cardiology:  No orders to display     GI:  No orders to display           Results from last 7 days   Lab Units 07/11/25  0918 07/08/25  0901   WBC Thousand/uL 8.85 5.06   HEMOGLOBIN g/dL 13.1 13.7   HEMATOCRIT % 40.2 42.6   PLATELETS Thousands/uL 250 243   TOTAL NEUT ABS Thousands/µL 6.37 2.93         Results from last 7 days   Lab Units 07/11/25  0918 07/08/25  0901   SODIUM mmol/L 141 139   POTASSIUM mmol/L 3.9 4.0   CHLORIDE mmol/L 109* 101   CO2 mmol/L 28 31   ANION GAP mmol/L 4 7   BUN mg/dL 16 18   CREATININE mg/dL 0.76 0.78   EGFR ml/min/1.73sq m 89 87   CALCIUM mg/dL 9.3 9.4     Results from last 7 days   Lab Units 07/08/25  0901   AST U/L 30   ALT U/L 24   ALK PHOS U/L 59   TOTAL PROTEIN g/dL 7.3   ALBUMIN g/dL 4.6   TOTAL BILIRUBIN mg/dL 0.54         Results from last 7 days   Lab Units 07/11/25  0918   GLUCOSE RANDOM mg/dL 101             No results found for:  "\"BETA-HYDROXYBUTYRATE\"                               Results from last 7 days   Lab Units 07/08/25  0901   TSH 3RD GENERATON uIU/mL 2.195                                                                                                           Past Medical History[1]  Present on Admission:  **None**      Admitting Diagnosis: Status migrainosus [G43.901]  Headache [R51.9]  Age/Sex: 55 y.o. female    Network Utilization Review Department  ATTENTION: Please call with any questions or concerns to [phone] and carefully listen to the prompts so that you are directed to the right person. All voicemails are confidential.   For Discharge needs, contact Care Management DC Support Team at [phone] opt. 2  Send all requests for admission clinical reviews, approved or denied determinations and any other requests to dedicated fax number below belonging to the campus where the patient is receiving treatment. List of dedicated fax numbers for the Facilities:  FACILITY NAME UR FAX NUMBER   ADMISSION DENIALS (Administrative/Medical Necessity) [phone]   DISCHARGE SUPPORT TEAM (NETWORK) [phone]   PARENT CHILD HEALTH (Maternity/NICU/Pediatrics) [phone]   [facility] [phone]   [facility] [phone]   [facility] [phone]   [facility] [phone]   [facility] [phone]   [facility] [phone]   [facility] [phone]   [facility] [phone]   [facility] [phone]   [facility] [phone]   [facility] [phone]   [facility] [phone]              [1]   Past Medical History:  Diagnosis Date    Headache(784.0)     Melanoma (HCC)     Thoracic " myofascial strain 06/22/2018

## 2025-07-11 NOTE — PLAN OF CARE
Problem: PAIN - ADULT  Goal: Verbalizes/displays adequate comfort level or baseline comfort level  Description: Interventions:  - Encourage patient to monitor pain and request assistance  - Assess pain using appropriate pain scale  - Administer analgesics as ordered based on type and severity of pain and evaluate response  - Implement non-pharmacological measures as appropriate and evaluate response  - Consider cultural and social influences on pain and pain management  - Notify physician/advanced practitioner if interventions unsuccessful or patient reports new pain  - Educate patient/family on pain management process including their role and importance of  reporting pain   - Provide non-pharmacologic/complimentary pain relief interventions  Outcome: Progressing     Problem: SAFETY ADULT  Goal: Patient will remain free of falls  Description: INTERVENTIONS:  - Educate patient/family on patient safety including physical limitations  - Instruct patient to call for assistance with activity   - Consider consulting OT/PT to assist with strengthening/mobility based on AM PAC & JH-HLM score  - Consult OT/PT to assist with strengthening/mobility   - Keep Call bell within reach  - Keep bed low and locked with side rails adjusted as appropriate  - Keep care items and personal belongings within reach  - Initiate and maintain comfort rounds  - Make Fall Risk Sign visible to staff  - Offer Toileting every 2 Hours, in advance of need  - Initiate/Maintain bed alarm  - Obtain necessary fall risk management equipment: socks  - Apply yellow socks and bracelet for high fall risk patients  - Consider moving patient to room near nurses station  Outcome: Progressing  Goal: Maintain or return to baseline ADL function  Description: INTERVENTIONS:  -  Assess patient's ability to carry out ADLs; assess patient's baseline for ADL function and identify physical deficits which impact ability to perform ADLs (bathing, care of mouth/teeth,  toileting, grooming, dressing, etc.)  - Assess/evaluate cause of self-care deficits   - Assess range of motion  - Assess patient's mobility; develop plan if impaired  - Assess patient's need for assistive devices and provide as appropriate  - Encourage maximum independence but intervene and supervise when necessary  - Involve family in performance of ADLs  - Assess for home care needs following discharge   - Consider OT consult to assist with ADL evaluation and planning for discharge  - Provide patient education as appropriate  - Monitor functional capacity and physical performance, use of AM PAC & JH-HLM   - Monitor gait, balance and fatigue with ambulation    Outcome: Progressing     Problem: DISCHARGE PLANNING  Goal: Discharge to home or other facility with appropriate resources  Description: INTERVENTIONS:  - Identify barriers to discharge w/patient and caregiver  - Arrange for needed discharge resources and transportation as appropriate  - Identify discharge learning needs (meds, wound care, etc.)  - Arrange for interpretive services to assist at discharge as needed  - Refer to Case Management Department for coordinating discharge planning if the patient needs post-hospital services based on physician/advanced practitioner order or complex needs related to functional status, cognitive ability, or social support system  Outcome: Progressing     Problem: Knowledge Deficit  Goal: Patient/family/caregiver demonstrates understanding of disease process, treatment plan, medications, and discharge instructions  Description: Complete learning assessment and assess knowledge base.  Interventions:  - Provide teaching at level of understanding  - Provide teaching via preferred learning methods  Outcome: Progressing

## 2025-07-12 ENCOUNTER — APPOINTMENT (INPATIENT)
Dept: MRI IMAGING | Facility: HOSPITAL | Age: 55
End: 2025-07-12
Payer: COMMERCIAL

## 2025-07-12 VITALS
HEART RATE: 75 BPM | WEIGHT: 145.06 LBS | OXYGEN SATURATION: 97 % | BODY MASS INDEX: 24.77 KG/M2 | DIASTOLIC BLOOD PRESSURE: 77 MMHG | RESPIRATION RATE: 18 BRPM | SYSTOLIC BLOOD PRESSURE: 125 MMHG | TEMPERATURE: 97.9 F | HEIGHT: 64 IN

## 2025-07-12 LAB
ALBUMIN SERPL BCG-MCNC: 3.6 G/DL (ref 3.5–5)
ALP SERPL-CCNC: 47 U/L (ref 34–104)
ALT SERPL W P-5'-P-CCNC: 17 U/L (ref 7–52)
ANION GAP SERPL CALCULATED.3IONS-SCNC: 5 MMOL/L (ref 4–13)
AST SERPL W P-5'-P-CCNC: 14 U/L (ref 13–39)
BILIRUB SERPL-MCNC: 0.37 MG/DL (ref 0.2–1)
BUN SERPL-MCNC: 21 MG/DL (ref 5–25)
CALCIUM SERPL-MCNC: 8.4 MG/DL (ref 8.4–10.2)
CHLORIDE SERPL-SCNC: 111 MMOL/L (ref 96–108)
CO2 SERPL-SCNC: 24 MMOL/L (ref 21–32)
CREAT SERPL-MCNC: 0.68 MG/DL (ref 0.6–1.3)
ERYTHROCYTE [DISTWIDTH] IN BLOOD BY AUTOMATED COUNT: 13.1 % (ref 11.6–15.1)
GFR SERPL CREATININE-BSD FRML MDRD: 100 ML/MIN/1.73SQ M
GLUCOSE SERPL-MCNC: 101 MG/DL (ref 65–140)
HCT VFR BLD AUTO: 35 % (ref 34.8–46.1)
HGB BLD-MCNC: 11.7 G/DL (ref 11.5–15.4)
MCH RBC QN AUTO: 31.2 PG (ref 26.8–34.3)
MCHC RBC AUTO-ENTMCNC: 33.4 G/DL (ref 31.4–37.4)
MCV RBC AUTO: 93 FL (ref 82–98)
PLATELET # BLD AUTO: 215 THOUSANDS/UL (ref 149–390)
PMV BLD AUTO: 10.4 FL (ref 8.9–12.7)
POTASSIUM SERPL-SCNC: 3.9 MMOL/L (ref 3.5–5.3)
PROT SERPL-MCNC: 5.6 G/DL (ref 6.4–8.4)
RBC # BLD AUTO: 3.75 MILLION/UL (ref 3.81–5.12)
SODIUM SERPL-SCNC: 140 MMOL/L (ref 135–147)
WBC # BLD AUTO: 8.77 THOUSAND/UL (ref 4.31–10.16)

## 2025-07-12 PROCEDURE — 99239 HOSP IP/OBS DSCHRG MGMT >30: CPT | Performed by: INTERNAL MEDICINE

## 2025-07-12 PROCEDURE — 70553 MRI BRAIN STEM W/O & W/DYE: CPT

## 2025-07-12 PROCEDURE — A9585 GADOBUTROL INJECTION: HCPCS | Performed by: INTERNAL MEDICINE

## 2025-07-12 PROCEDURE — 85027 COMPLETE CBC AUTOMATED: CPT | Performed by: INTERNAL MEDICINE

## 2025-07-12 PROCEDURE — 80053 COMPREHEN METABOLIC PANEL: CPT | Performed by: INTERNAL MEDICINE

## 2025-07-12 RX ORDER — METOCLOPRAMIDE 10 MG/1
10 TABLET ORAL 3 TIMES DAILY PRN
Qty: 30 TABLET | Refills: 0 | Status: SHIPPED | OUTPATIENT
Start: 2025-07-12 | End: 2025-07-17

## 2025-07-12 RX ORDER — MAGNESIUM OXIDE 400 MG/1
400 TABLET ORAL DAILY
Qty: 90 TABLET | Refills: 0 | Status: SHIPPED | OUTPATIENT
Start: 2025-07-12

## 2025-07-12 RX ORDER — RIBOFLAVIN (VITAMIN B2) 400 MG
400 TABLET ORAL DAILY
Qty: 90 TABLET | Refills: 0 | Status: SHIPPED | OUTPATIENT
Start: 2025-07-12

## 2025-07-12 RX ORDER — GADOBUTROL 604.72 MG/ML
6 INJECTION INTRAVENOUS
Status: COMPLETED | OUTPATIENT
Start: 2025-07-12 | End: 2025-07-12

## 2025-07-12 RX ADMIN — DIPHENHYDRAMINE HYDROCHLORIDE 25 MG: 50 INJECTION, SOLUTION INTRAMUSCULAR; INTRAVENOUS at 06:14

## 2025-07-12 RX ADMIN — METOCLOPRAMIDE 10 MG: 5 INJECTION, SOLUTION INTRAMUSCULAR; INTRAVENOUS at 13:13

## 2025-07-12 RX ADMIN — DIPHENHYDRAMINE HYDROCHLORIDE 25 MG: 50 INJECTION, SOLUTION INTRAMUSCULAR; INTRAVENOUS at 13:13

## 2025-07-12 RX ADMIN — METOCLOPRAMIDE 10 MG: 5 INJECTION, SOLUTION INTRAMUSCULAR; INTRAVENOUS at 06:14

## 2025-07-12 RX ADMIN — GADOBUTROL 6 ML: 604.72 INJECTION INTRAVENOUS at 14:14

## 2025-07-12 RX ADMIN — KETOROLAC TROMETHAMINE 30 MG: 30 INJECTION, SOLUTION INTRAMUSCULAR; INTRAVENOUS at 13:13

## 2025-07-12 RX ADMIN — SODIUM CHLORIDE 100 ML/HR: 0.9 INJECTION, SOLUTION INTRAVENOUS at 06:14

## 2025-07-12 RX ADMIN — MAGNESIUM SULFATE HEPTAHYDRATE 2 G: 40 INJECTION, SOLUTION INTRAVENOUS at 08:00

## 2025-07-12 RX ADMIN — PREDNISONE 20 MG: 20 TABLET ORAL at 08:00

## 2025-07-12 RX ADMIN — TIZANIDINE 4 MG: 4 TABLET ORAL at 08:00

## 2025-07-12 RX ADMIN — KETOROLAC TROMETHAMINE 30 MG: 30 INJECTION, SOLUTION INTRAMUSCULAR; INTRAVENOUS at 06:14

## 2025-07-12 NOTE — DISCHARGE SUMMARY
Discharge Summary - Hospitalist   Name: Thao Ahumada 55 y.o. female I MRN: [id number]  Unit/Bed#: [place] [address] -01 I Date of Admission: 7/11/2025   Date of Service: 7/12/2025 I Hospital Day: 1    Assessment & Plan  Status migrainosus  History of menstrual migraines who presents to the hospital with refractory headaches  Has been persistent over the past week.  Did see PCP and failed outpatient management  Given migraine cocktail in ED with improvement  Seen by neurology and given migraine cocktail and antispasmodics.  CTA and MRI of the brain negative.  Discharge: Metoclopramide tizanidine as needed.  Continue prednisone taper as previously prescribed.  Prescription for Mag-Ox and riboflavin also recommended.  Musculoskeletal back pain  Does have cervical musculoskeletal spasm  Improved with tizanidine.  Well adult exam  Patient otherwise healthy without other medical comorbidities      Medical Problems       Resolved Problems  Date Reviewed: 7/12/2025   None        Discharging Physician / Practitioner: Jose L Day DO  PCP: Talon Lua DO  Admission Date:   Admission Orders (From admission, onward)       Ordered        07/11/25 0902  INPATIENT ADMISSION  Once                        Discharge Date: 07/12/25    Medication Changes for Discharge & Rationale:   See after visit summary for reconciled discharge medications provided to patient and/or family.     Consultations During Hospital Stay:  IP CONSULT TO NEUROLOGY     Procedures Performed:   * No surgery found *     Images:   MRI brain w wo contrast  Result Date: 7/12/2025  Impression: Normal examination. Workstation performed: [id number]     CTA head and neck with and without contrast  Result Date: 7/11/2025  Impression: CT Brain: No acute intracranial CT abnormality. CT Angiography: 1.  Patent major vessels of the [place] without significant stenosis. No aneurysm. 2.  Unremarkable CT angiogram of the neck Workstation performed: [id number]        Lab Results: I have reviewed the following results:  Results from last 7 days   Lab Units 07/12/25 0438 07/11/25 0918 07/08/25  0901   WBC Thousand/uL 8.77 8.85 5.06   HEMOGLOBIN g/dL 11.7 13.1 13.7   HEMATOCRIT % 35.0 40.2 42.6   MCV fL 93 94 98   PLATELETS Thousands/uL 215 250 243     Results from last 7 days   Lab Units 07/12/25 0438 07/11/25 0918 07/08/25  0901   SODIUM mmol/L 140 141 139   POTASSIUM mmol/L 3.9 3.9 4.0   CHLORIDE mmol/L 111* 109* 101   CO2 mmol/L 24 28 31   BUN mg/dL 21 16 18   CREATININE mg/dL 0.68 0.76 0.78   CALCIUM mg/dL 8.4 9.3 9.4   ALBUMIN g/dL 3.6  --  4.6   TOTAL BILIRUBIN mg/dL 0.37  --  0.54   ALK PHOS U/L 47  --  59   ALT U/L 17  --  24   AST U/L 14  --  30   EGFR ml/min/1.73sq m 100 89 87   GLUCOSE RANDOM mg/dL 101 101  --                Results from last 7 days   Lab Units 07/08/25  0901   TSH 3RD GENERATON uIU/mL 2.195         Results from last 7 days   Lab Units 07/08/25  0901   TRIGLYCERIDES mg/dL 88   CHOLESTEROL mg/dL 204*   LDL CALC mg/dL 100   HDL mg/dL 86                           Incidental Findings:      Test Results Pending at Discharge (will require follow up):      Reason for Admission:   Headache (Pt reports recurrent headache associated with nausea, saw PCP was given steroid shot and other medication and has not had any relief. Denies head injury)    Hospital Course:   Thao Ahumada is a 55 y.o. female patient who originally presented to the hospital on 7/11/2025 due to migraine headache.  She was seen by neurology recommended admission.  She was placed on migraine cocktail including metoclopramide magnesium and tizanidine.  Her pain has improved.  Imaging negative.  She is being discharged home and recommend outpatient follow-up with headache clinic.    Please see above list of diagnoses and related plan for additional information.     Condition at Discharge: stable     Discharge Day Visit / Exam:   Subjective: Patient seen and examined.  Feeling  "better today.  Pain down to 3/10.    Vitals: Blood Pressure: 125/77 (07/12/25 0732)  Pulse: 75 (07/12/25 0732)  Temperature: 97.9 °F (36.6 °C) (07/12/25 0732)  Temp Source: Oral (07/12/25 0732)  Respirations: 18 (07/12/25 0732)  Height: 5' 4\" (162.6 cm) (07/11/25 1041)  Weight - Scale: 65.8 kg (145 lb 1 oz) (07/11/25 1041)  SpO2: 97 % (07/12/25 0732)    Exam:   Physical Exam  Vitals reviewed.   Constitutional:       General: She is not in acute distress.     Appearance: Normal appearance.   HENT:      Head: Atraumatic.     Eyes:      Extraocular Movements: Extraocular movements intact.       Cardiovascular:      Rate and Rhythm: Regular rhythm.      Heart sounds:      No gallop.   Pulmonary:      Effort: No respiratory distress.      Breath sounds: Decreased breath sounds present.   Abdominal:      General: Bowel sounds are normal.      Palpations: Abdomen is soft.      Tenderness: There is no guarding.     Musculoskeletal:         General: Tenderness (Cervical spine) present. No deformity.     Skin:     General: Skin is warm.     Neurological:      General: No focal deficit present.      Mental Status: She is alert.      Motor: No weakness.     Psychiatric:         Mood and Affect: Mood normal.       Discussion with Family: Spouse at bedside    Discharge instructions/Information to patient and family:   See after visit summary for information provided to patient and family.      Provisions for Follow-Up Care:  See after visit summary for information related to follow-up care and any pertinent home health orders.      Mobility at time of Discharge:  Basic Mobility Inpatient Raw Score: 24  JH-HLM Goal: 8: Walk 250 feet or more  JH-HLM Achieved: 8: Walk 250 feet ot more  JH-HLM Goal achieved. Continue to encourage appropriate mobility.    Disposition:   Home    Planned Readmission: No    Administrative Statements   I spent 35 minutes discharging the patient. This time was spent on the day of discharge. I had direct " contact with the patient on the day of discharge. Greater than 50% of the total time was spent examining patient, answering all patient questions, arranging and discussing plan of care with patient as well as directly providing post-discharge instructions.  Additional time then spent on discharge activities.    **Please Note: This note may have been constructed using a voice recognition system**

## 2025-07-12 NOTE — ASSESSMENT & PLAN NOTE
History of menstrual migraines who presents to the hospital with refractory headaches  Has been persistent over the past week.  Did see PCP and failed outpatient management  Given migraine cocktail in ED with improvement  Seen by neurology and given migraine cocktail and antispasmodics.  CTA and MRI of the brain negative.  Discharge: Metoclopramide tizanidine as needed.  Continue prednisone taper as previously prescribed.  Prescription for Mag-Ox and riboflavin also recommended.   Spontaneous, unlabored and symmetrical

## 2025-07-14 ENCOUNTER — TELEPHONE (OUTPATIENT)
Age: 55
End: 2025-07-14

## 2025-07-14 ENCOUNTER — TRANSITIONAL CARE MANAGEMENT (OUTPATIENT)
Age: 55
End: 2025-07-14

## 2025-07-14 NOTE — TELEPHONE ENCOUNTER
[facility] schedule 9/10/2025 at 12 pm in [place] with Kumar Holley.    Scheduled per recommendation.  Thao Ahumada will need follow-up in in 6 weeks with headache team for Migraine in 60 minute appointment. They will not require outpatient neurological testing.       Patient was offered sooner appointment dates of 8/12 in [place], 8/22 in [place] and 9/2 in [place]. Patient declined all due to scheduling conflicts.

## 2025-07-14 NOTE — TELEPHONE ENCOUNTER
1ST ATTEMPT,     KEYONNA BARRAGAN     Thank you,     Tari VERGARA/ HERBERT ALL/ MIGRAINE     DC- HOME- 7/12/2025    ----- Message from Ayanna Jackson PA-C sent at 7/11/2025  4:16 PM EDT -----  Regarding: JAI  Thao Ahumada will need follow-up in in 6 weeks with headache team for Migraine in 60 minute appointment. They will not require outpatient neurological testing.

## 2025-07-14 NOTE — UTILIZATION REVIEW
NOTIFICATION OF ADMISSION DISCHARGE   This is a Notification of Discharge from [facility]. Please be advised that this patient has been discharge from our facility. Below you will find the admission and discharge date and time including the patient’s disposition.   UTILIZATION REVIEW CONTACT:  Utilization Review Assistants  Network Utilization Review Department  Phone: [phone] x carefully listen to the prompts. All voicemails are confidential.  Email: [email or web address]     ADMISSION INFORMATION  PRESENTATION DATE: 7/11/2025  4:51 AM  OBERVATION ADMISSION DATE: N/A  INPATIENT ADMISSION DATE: 7/11/25  9:02 AM   DISCHARGE DATE: 7/12/2025  3:41 PM   DISPOSITION:Home/Self Care    Network Utilization Review Department  ATTENTION: Please call with any questions or concerns to [phone] and carefully listen to the prompts so that you are directed to the right person. All voicemails are confidential.   For Discharge needs, contact Care Management DC Support Team at [phone] opt. 2  Send all requests for admission clinical reviews, approved or denied determinations and any other requests to dedicated fax number below belonging to the campus where the patient is receiving treatment. List of dedicated fax numbers for the Facilities:  FACILITY NAME UR FAX NUMBER   ADMISSION DENIALS (Administrative/Medical Necessity) [phone]   DISCHARGE SUPPORT TEAM ([facility]) [phone]   PARENT CHILD HEALTH (Maternity/NICU/Pediatrics) [phone]   [facility] [phone]   [facility] [phone]   [facility] [phone]   [facility] [phone]   [facility] [phone]   [facility] [phone]   [facility] [phone]   [facility] [phone]   [facility]  [facility] [phone]   [facility] [phone]   [facility] [phone]   [facility] [phone]

## 2025-07-17 ENCOUNTER — OFFICE VISIT (OUTPATIENT)
Age: 55
End: 2025-07-17
Payer: COMMERCIAL

## 2025-07-17 ENCOUNTER — APPOINTMENT (OUTPATIENT)
Dept: LAB | Facility: MEDICAL CENTER | Age: 55
End: 2025-07-17
Payer: COMMERCIAL

## 2025-07-17 VITALS
WEIGHT: 145 LBS | SYSTOLIC BLOOD PRESSURE: 138 MMHG | TEMPERATURE: 98.5 F | DIASTOLIC BLOOD PRESSURE: 82 MMHG | BODY MASS INDEX: 24.75 KG/M2 | OXYGEN SATURATION: 98 % | HEART RATE: 80 BPM | HEIGHT: 64 IN

## 2025-07-17 DIAGNOSIS — G43.901 STATUS MIGRAINOSUS: Primary | ICD-10-CM

## 2025-07-17 DIAGNOSIS — G44.52 NEW DAILY PERSISTENT HEADACHE: ICD-10-CM

## 2025-07-17 DIAGNOSIS — S16.1XXD STRAIN OF NECK MUSCLE, SUBSEQUENT ENCOUNTER: ICD-10-CM

## 2025-07-17 LAB — CRP SERPL QL: 2.6 MG/L

## 2025-07-17 PROCEDURE — 99495 TRANSJ CARE MGMT MOD F2F 14D: CPT | Performed by: FAMILY MEDICINE

## 2025-07-17 PROCEDURE — 86140 C-REACTIVE PROTEIN: CPT

## 2025-07-17 PROCEDURE — 36415 COLL VENOUS BLD VENIPUNCTURE: CPT

## 2025-07-17 PROCEDURE — 86618 LYME DISEASE ANTIBODY: CPT

## 2025-07-17 NOTE — ASSESSMENT & PLAN NOTE
Resolved.  Patient will use probiotics as directed.  Continue with magnesium and B2 supplementation.  Continue with prednisone taper.          DC instructions

## 2025-07-17 NOTE — PROGRESS NOTES
Transition of Care Visit:  Name: Thao Ahumada      : 1970      MRN: [id number]  Encounter Provider: Talon Lua DO  Encounter Date: 2025   Encounter department: [facility] PRIMARY CARE    Assessment & Plan  Status migrainosus  Resolved.  Patient will use probiotics as directed.  Continue with magnesium and B2 supplementation.  Continue with prednisone taper.         New daily persistent headache  Check laboratory studies.  Orders:    [place] Total AB W Reflex to IGM/IGG; Future    C-reactive protein; Future    Strain of neck muscle, subsequent encounter  Referral to physical therapy.  Patient may go for massage.  To consider seeing chiropractor.  Patient will follow-up if not seeing improvement.  Patient did not tolerate muscle relaxers.  Orders:    Ambulatory Referral to Physical Therapy; Future         History of Present Illness     Transitional Care Management Review:   Thao Ahumada is a 55 y.o. female here for TCM follow up.     During the TCM phone call patient stated:  TCM Call (since 7/3/2025)       Date and time call was made  2025 12:32 PM    Patient was hospitialized at  [facility]    Date of Admission  25    Date of discharge  25    Diagnosis  STATUS MIGRAINOSUS    Disposition  Home    Were the patients medications reviewed and updated  Yes    Current Symptoms  None          TCM Call (since 7/3/2025)       Post hospital issues  None    Scheduled for follow up?  Yes    Did you obtain your prescribed medications  Yes    Do you need help managing your prescriptions or medications  No    Is transportation to your appointment needed  No    I have advised the patient to call PCP with any new or worsening symptoms  CHRISTIAN SAUCEDA CMA    Living Arrangements  Spouse or Significiant other    Support System  Spouse    The type of support provided  Financial; Physical; Emotional    Do you have social support  Yes, as much as I need    Are you recieving home  "care services  No          Patient is here status post hospitalization from July 11 through the 12th for status migrainous.  Patient given migraine cocktail in the ER and also with neurology.  Patient was placed on Reglan as well as Zanaflex and continued on prednisone and had magnesium and B2 added to her regimen.  Patient did see improvement.  Patient did have CTA as well as MRI of the brain which were unremarkable.  Patient does have follow-up with neurology on 12 August.  Patient has stopped Reglan and Zanaflex due to side effects.  Patient presently on prednisone taper B2, magnesium.  Patient with dull headache in the back/occipital region but significantly improved overall.  No new neurologic issues noted.  No fevers or chills or chest pain shortness of breath or difficulty with urinating or defecating at this time.  Patient with paravertebral spasm bilateral cervical region.  Patient has gone for massage with some improvement.      Review of Systems   Constitutional: Negative.    HENT: Negative.     Eyes: Negative.    Respiratory: Negative.     Cardiovascular: Negative.    Gastrointestinal: Negative.    Endocrine: Negative.    Genitourinary: Negative.    Musculoskeletal:  Positive for neck pain and neck stiffness.   Skin: Negative.    Allergic/Immunologic: Negative.    Neurological:  Positive for headaches.   Hematological: Negative.    Psychiatric/Behavioral: Negative.       Objective   /82 (BP Location: Left arm, Patient Position: Sitting, Cuff Size: Standard)   Pulse 80   Temp 98.5 °F (36.9 °C) (Temporal)   Ht 5' 4\" (1.626 m)   Wt 65.8 kg (145 lb)   LMP 03/13/2023 (Exact Date)   SpO2 98%   BMI 24.89 kg/m²     Physical Exam  Vitals and nursing note reviewed.   Constitutional:       General: She is not in acute distress.     Appearance: Normal appearance. She is well-developed. She is not ill-appearing, toxic-appearing or diaphoretic.   HENT:      Head: Normocephalic and atraumatic.      Right " Ear: Tympanic membrane, ear canal and external ear normal.      Left Ear: Tympanic membrane, ear canal and external ear normal.      Nose: Nose normal.      Mouth/Throat:      Mouth: Mucous membranes are moist.      Pharynx: No oropharyngeal exudate or posterior oropharyngeal erythema.     Eyes:      General:         Right eye: No discharge.         Left eye: No discharge.      Conjunctiva/sclera: Conjunctivae normal.      Pupils: Pupils are equal, round, and reactive to light.     Neck:      Thyroid: No thyromegaly.      Vascular: No carotid bruit.      Trachea: No tracheal deviation.      Comments: Full range of motion cervical region.  Paravertebral spasm noted in the cervical muscles and trapezius muscles bilaterally.  Cardiovascular:      Rate and Rhythm: Normal rate and regular rhythm.      Pulses: Normal pulses.      Heart sounds: Normal heart sounds. No murmur heard.     No gallop.   Pulmonary:      Effort: Pulmonary effort is normal. No respiratory distress.      Breath sounds: Normal breath sounds. No stridor. No wheezing or rales.   Chest:      Chest wall: No tenderness.   Abdominal:      General: Bowel sounds are normal.     Musculoskeletal:         General: No deformity. Normal range of motion.      Cervical back: Normal range of motion and neck supple. Tenderness present.   Lymphadenopathy:      Cervical: No cervical adenopathy.     Skin:     General: Skin is warm and dry.      Capillary Refill: Capillary refill takes less than 2 seconds.      Coloration: Skin is not pale.      Findings: No erythema or rash.     Neurological:      Mental Status: She is alert and oriented to person, place, and time. Mental status is at baseline.      Motor: No abnormal muscle tone.      Gait: Gait normal.     Psychiatric:         Mood and Affect: Mood normal.         Behavior: Behavior normal.         Thought Content: Thought content normal.         Judgment: Judgment normal.       Medications have been reviewed by  provider in current encounter

## 2025-07-18 LAB — B BURGDOR IGG+IGM SER QL IA: NEGATIVE

## 2025-08-10 PROBLEM — Z00.00 WELL ADULT EXAM: Status: RESOLVED | Noted: 2024-06-19 | Resolved: 2025-08-10

## 2025-08-12 ENCOUNTER — OFFICE VISIT (OUTPATIENT)
Dept: NEUROLOGY | Facility: CLINIC | Age: 55
End: 2025-08-12
Payer: COMMERCIAL

## 2025-08-14 ENCOUNTER — TELEPHONE (OUTPATIENT)
Age: 55
End: 2025-08-14